# Patient Record
Sex: FEMALE | Race: BLACK OR AFRICAN AMERICAN | NOT HISPANIC OR LATINO | Employment: FULL TIME | URBAN - METROPOLITAN AREA
[De-identification: names, ages, dates, MRNs, and addresses within clinical notes are randomized per-mention and may not be internally consistent; named-entity substitution may affect disease eponyms.]

---

## 2019-03-29 ENCOUNTER — HOSPITAL ENCOUNTER (EMERGENCY)
Facility: OTHER | Age: 59
Discharge: HOME | End: 2019-03-29
Attending: EMERGENCY MEDICINE
Payer: COMMERCIAL

## 2019-03-29 VITALS
BODY MASS INDEX: 31.99 KG/M2 | OXYGEN SATURATION: 96 % | TEMPERATURE: 98 F | HEART RATE: 84 BPM | WEIGHT: 192 LBS | HEIGHT: 65 IN | RESPIRATION RATE: 18 BRPM | SYSTOLIC BLOOD PRESSURE: 133 MMHG | DIASTOLIC BLOOD PRESSURE: 65 MMHG

## 2019-03-29 DIAGNOSIS — R03.0 ELEVATED BLOOD PRESSURE READING WITHOUT DIAGNOSIS OF HYPERTENSION: Primary | ICD-10-CM

## 2019-03-29 DIAGNOSIS — R51.9 HEADACHE, UNSPECIFIED HEADACHE TYPE: ICD-10-CM

## 2019-03-29 LAB
ALBUMIN SERPL BCP-MCNC: 3.7 G/DL (ref 3.5–5.2)
ALP SERPL-CCNC: 81 U/L (ref 55–135)
ALT SERPL W/O P-5'-P-CCNC: 15 U/L (ref 10–44)
ANION GAP SERPL CALC-SCNC: 6 MMOL/L (ref 8–16)
AST SERPL-CCNC: 17 U/L (ref 10–40)
BACTERIA #/AREA URNS HPF: ABNORMAL /HPF
BASOPHILS # BLD AUTO: 0.03 K/UL (ref 0–0.2)
BASOPHILS NFR BLD: 0.5 % (ref 0–1.9)
BILIRUB SERPL-MCNC: 0.2 MG/DL (ref 0.1–1)
BILIRUB UR QL STRIP: NEGATIVE
BNP SERPL-MCNC: 13 PG/ML (ref 0–99)
BUN SERPL-MCNC: 17 MG/DL (ref 6–20)
CALCIUM SERPL-MCNC: 9.8 MG/DL (ref 8.7–10.5)
CHLORIDE SERPL-SCNC: 109 MMOL/L (ref 95–110)
CLARITY UR: CLEAR
CO2 SERPL-SCNC: 26 MMOL/L (ref 23–29)
COLOR UR: YELLOW
CREAT SERPL-MCNC: 1 MG/DL (ref 0.5–1.4)
DIFFERENTIAL METHOD: ABNORMAL
EOSINOPHIL # BLD AUTO: 0.1 K/UL (ref 0–0.5)
EOSINOPHIL NFR BLD: 1.7 % (ref 0–8)
ERYTHROCYTE [DISTWIDTH] IN BLOOD BY AUTOMATED COUNT: 13.2 % (ref 11.5–14.5)
EST. GFR  (AFRICAN AMERICAN): >60 ML/MIN/1.73 M^2
EST. GFR  (NON AFRICAN AMERICAN): >60 ML/MIN/1.73 M^2
GLUCOSE SERPL-MCNC: 104 MG/DL (ref 70–110)
GLUCOSE UR QL STRIP: NEGATIVE
HCT VFR BLD AUTO: 37.9 % (ref 37–48.5)
HGB BLD-MCNC: 12 G/DL (ref 12–16)
HGB UR QL STRIP: ABNORMAL
KETONES UR QL STRIP: NEGATIVE
LEUKOCYTE ESTERASE UR QL STRIP: NEGATIVE
LYMPHOCYTES # BLD AUTO: 2.9 K/UL (ref 1–4.8)
LYMPHOCYTES NFR BLD: 43.6 % (ref 18–48)
MCH RBC QN AUTO: 28.9 PG (ref 27–31)
MCHC RBC AUTO-ENTMCNC: 31.7 G/DL (ref 32–36)
MCV RBC AUTO: 91 FL (ref 82–98)
MICROSCOPIC COMMENT: ABNORMAL
MONOCYTES # BLD AUTO: 0.4 K/UL (ref 0.3–1)
MONOCYTES NFR BLD: 5.4 % (ref 4–15)
NEUTROPHILS # BLD AUTO: 3.2 K/UL (ref 1.8–7.7)
NEUTROPHILS NFR BLD: 48.6 % (ref 38–73)
NITRITE UR QL STRIP: NEGATIVE
PH UR STRIP: 6 [PH] (ref 5–8)
PLATELET # BLD AUTO: 303 K/UL (ref 150–350)
PMV BLD AUTO: 10.1 FL (ref 9.2–12.9)
POTASSIUM SERPL-SCNC: 4.5 MMOL/L (ref 3.5–5.1)
PROT SERPL-MCNC: 7.6 G/DL (ref 6–8.4)
PROT UR QL STRIP: NEGATIVE
RBC # BLD AUTO: 4.15 M/UL (ref 4–5.4)
RBC #/AREA URNS HPF: 1 /HPF (ref 0–4)
SODIUM SERPL-SCNC: 141 MMOL/L (ref 136–145)
SP GR UR STRIP: <=1.005 (ref 1–1.03)
SQUAMOUS #/AREA URNS HPF: 5 /HPF
TROPONIN I SERPL DL<=0.01 NG/ML-MCNC: <0.006 NG/ML (ref 0–0.03)
URN SPEC COLLECT METH UR: ABNORMAL
UROBILINOGEN UR STRIP-ACNC: NEGATIVE EU/DL
WBC # BLD AUTO: 6.65 K/UL (ref 3.9–12.7)
WBC #/AREA URNS HPF: 2 /HPF (ref 0–5)
YEAST URNS QL MICRO: ABNORMAL

## 2019-03-29 PROCEDURE — 96374 THER/PROPH/DIAG INJ IV PUSH: CPT

## 2019-03-29 PROCEDURE — 99284 EMERGENCY DEPT VISIT MOD MDM: CPT | Mod: 25

## 2019-03-29 PROCEDURE — 83880 ASSAY OF NATRIURETIC PEPTIDE: CPT

## 2019-03-29 PROCEDURE — 84484 ASSAY OF TROPONIN QUANT: CPT

## 2019-03-29 PROCEDURE — 80053 COMPREHEN METABOLIC PANEL: CPT

## 2019-03-29 PROCEDURE — 96375 TX/PRO/DX INJ NEW DRUG ADDON: CPT

## 2019-03-29 PROCEDURE — 63600175 PHARM REV CODE 636 W HCPCS: Performed by: EMERGENCY MEDICINE

## 2019-03-29 PROCEDURE — 81000 URINALYSIS NONAUTO W/SCOPE: CPT

## 2019-03-29 PROCEDURE — 85025 COMPLETE CBC W/AUTO DIFF WBC: CPT

## 2019-03-29 RX ORDER — DIPHENHYDRAMINE HYDROCHLORIDE 50 MG/ML
25 INJECTION INTRAMUSCULAR; INTRAVENOUS
Status: COMPLETED | OUTPATIENT
Start: 2019-03-29 | End: 2019-03-29

## 2019-03-29 RX ORDER — FLUTICASONE PROPIONATE 50 MCG
1 SPRAY, SUSPENSION (ML) NASAL 2 TIMES DAILY PRN
Qty: 15 G | Refills: 0 | Status: SHIPPED | OUTPATIENT
Start: 2019-03-29 | End: 2023-03-23 | Stop reason: SDUPTHER

## 2019-03-29 RX ORDER — CETIRIZINE HYDROCHLORIDE 10 MG/1
10 TABLET ORAL DAILY
Qty: 10 TABLET | Refills: 0 | Status: SHIPPED | OUTPATIENT
Start: 2019-03-29 | End: 2020-03-28

## 2019-03-29 RX ORDER — PROCHLORPERAZINE EDISYLATE 5 MG/ML
10 INJECTION INTRAMUSCULAR; INTRAVENOUS
Status: COMPLETED | OUTPATIENT
Start: 2019-03-29 | End: 2019-03-29

## 2019-03-29 RX ORDER — KETOROLAC TROMETHAMINE 30 MG/ML
10 INJECTION, SOLUTION INTRAMUSCULAR; INTRAVENOUS
Status: COMPLETED | OUTPATIENT
Start: 2019-03-29 | End: 2019-03-29

## 2019-03-29 RX ADMIN — PROCHLORPERAZINE EDISYLATE 10 MG: 5 INJECTION INTRAMUSCULAR; INTRAVENOUS at 09:03

## 2019-03-29 RX ADMIN — KETOROLAC TROMETHAMINE 10 MG: 30 INJECTION, SOLUTION INTRAMUSCULAR; INTRAVENOUS at 09:03

## 2019-03-29 RX ADMIN — DIPHENHYDRAMINE HYDROCHLORIDE 25 MG: 50 INJECTION, SOLUTION INTRAMUSCULAR; INTRAVENOUS at 09:03

## 2019-03-30 NOTE — ED PROVIDER NOTES
"Encounter Date: 3/29/2019    SCRIBE #1 NOTE: I, Alicia Brooksmayehu, am scribing for, and in the presence of, Dr. Augustine.       History     Chief Complaint   Patient presents with    Headache     +intermittent headache x 2 months. pt states " I usually get headaches off and on but over the last 2 months it has been getting worse. since last night it has been constant. 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 " pt answering questions appropriately, no slurred speech, facial droop. no bilateral limb drift noted. pt reports pain unreleived by exedrin. pt reports nausea but denies vomiting, cp, sob, fever, chills      Time seen by provider: 8:39 PM    This is a 58 y.o. female who presents with complaint of HA for years, daily in the past few weeks. Pt states that HA has a cap-like distribution to the entire head with intermittent "sharp" pains to the R temple. She states that severity of HAs change daily, but is 10/10 at it's worst. She has been taking Fioricet and other OTC HA medication without relief; pt last took Excedrin tension HA today. Pt states that SHERMAN feels like a "tension," but normally comes without neck pain. She does have intermittent neck pain but attributes this to lifting weights. She reports intermittent jaw pain, but has no known grinding of teeth or clenching of jaw while sleeping. She has had blurred vision for the past few days. Pt does have glasses, but does not wear them often or have them with her. She states that one eye is near-sighted, while the other is far-sighted. Pt also c/o tingling and numbness of the L hand, fingers included, for the past few weeks. 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 She reports episode of chest pain earlier this week, since resolved. She c/o fatigue and nausea. Pt has had BLE swelling; she was on lasix but states she has discontinued use. She reports sore throat, for which she saw ENT last month, starting on steroid and Z-Leroy at that " time which provided some relief. She has had a cough at night.  She has no hx of HTN, stating that she had normal blood pressure with OB/GYN in [facility] three months ago; she does not otherwise check blood pressure.Pt has not used any nasal sprays. She take Wellbutrin and Melatonin daily, but is on no other daily medications. She did go to urgent care today, but was advised to come to ED for further work-up. Pt does not smoke, drink, or use illicit drugs. She has NKDA. She denies any fever, chills, nasal congestion, vomiting, diarrhea, palpitations, SOB, wheezing, neck stiffness, back pain, dizziness, weakness, lightheadedness, photophobia, rash, and urinary sx. Pt does has chronic abdominal pain, for which she is receiving work-up with OB/GYN. Pt does not believe today's sx are related to abdominal pain.    The history is provided by the patient.     Review of patient's allergies indicates:  No Known Allergies  History reviewed. No pertinent past medical history.  History reviewed. No pertinent surgical history.  History reviewed. No pertinent family history.  Social History     Tobacco Use    Smoking status: Never Smoker   Substance Use Topics    Alcohol use: Never     Frequency: Never    Drug use: Never     Review of Systems   Constitutional: Positive for fatigue. Negative for chills and fever.   HENT: Positive for sore throat. Negative for congestion.         Positive for jaw pain.   Eyes: Positive for visual disturbance (blurred). Negative for photophobia.   Respiratory: Positive for cough. Negative for shortness of breath.    Cardiovascular: Positive for chest pain (resolved) and leg swelling. Negative for palpitations.   Gastrointestinal: Positive for abdominal pain (chronic, unchanged) and nausea. Negative for blood in stool, constipation, diarrhea and vomiting.   Genitourinary: Negative for decreased urine volume, dysuria and vaginal discharge.   Musculoskeletal: Positive for neck pain. Negative  for back pain, joint swelling and neck stiffness.   Skin: Negative for rash and wound.   Neurological: Positive for numbness (L hand) and headaches. Negative for dizziness, weakness and light-headedness.        Positive for tingling to the L hand.   Psychiatric/Behavioral: Negative for confusion.       Physical Exam     Initial Vitals [03/29/19 1951]   BP Pulse Resp Temp SpO2   (!) 194/95 93 18 98 °F (36.7 °C) 97 %      MAP       --           Physical Exam    Nursing note and vitals reviewed.  Constitutional: She appears well-developed and well-nourished. No distress.   HENT:   Head: Normocephalic and atraumatic.   Mouth/Throat: No oropharyngeal exudate.   Turbinate swelling. Mild cobblestoning of the posterior oropharynx.   Bilateral ears: Mild diminished light reflex. No erythema or effusion.  No tenderness to palpation over temporal artery bilaterally.   Eyes: Conjunctivae and EOM are normal. Pupils are equal, round, and reactive to light.   Evidence of cataracts bilaterally.   Neck: Normal range of motion. Neck supple.   Cardiovascular: Normal rate, regular rhythm and normal heart sounds.   No murmur heard.  Pulmonary/Chest: Breath sounds normal. No respiratory distress. She has no wheezes. She has no rhonchi. She has no rales.   Abdominal: Soft. Bowel sounds are normal. There is no tenderness.   Musculoskeletal: Normal range of motion.   Trace pretibial edema.   Neurological: She is alert and oriented to person, place, and time. She has normal strength. No cranial nerve deficit or sensory deficit.   AI/PI/R/U/M intact for motor and sensory exam bilateral upper extremities.   Skin: Skin is warm and dry. No rash noted.   Psychiatric: She has a normal mood and affect. Her behavior is normal.         ED Course   Procedures  Labs Reviewed   CBC W/ AUTO DIFFERENTIAL - Abnormal; Notable for the following components:       Result Value    MCHC 31.7 (*)     All other components within normal limits   COMPREHENSIVE  METABOLIC PANEL - Abnormal; Notable for the following components:    Anion Gap 6 (*)     All other components within normal limits   URINALYSIS, REFLEX TO URINE CULTURE - Abnormal; Notable for the following components:    Specific Gravity, UA <=1.005 (*)     Occult Blood UA 2+ (*)     All other components within normal limits    Narrative:     Preferred Collection Type->Urine, Clean Catch   URINALYSIS MICROSCOPIC - Abnormal; Notable for the following components:    Bacteria, UA Few (*)     Yeast, UA Few (*)     All other components within normal limits    Narrative:     Preferred Collection Type->Urine, Clean Catch   B-TYPE NATRIURETIC PEPTIDE   TROPONIN I             Medical Decision Making:   Clinical Tests:   Lab Tests: Ordered and Reviewed  ED Management:  Urgent evaluation a 58-year-old female who presents with many complaints.  Vital signs reveal hypertension, afebrile.  Physical exam reveals no demonstrable neurologic deficit.  I do suspect some radiculopathy since upper extremity paresthesias are not in a peripheral nerve distribution to suggest carpal tunnel syndrome.  I did consider possibility of CVA, but patient refused head CT.  She was treated with a headache cocktail with much improvement in the headache. I do not think this represents temporal arteritis or subarachnoid hemorrhage. Lab workup shows no evidence of end-organ damage from hypertension, and since she complained of chest pain earlier this week a screening troponin was performed and negative. I doubt AMI.  She complained of visual disturbance which I believe is related to her cataracts rather than a neurologic source.  Ultimately she felt much better and blood pressure improved by treating the headache pain, and she is discharged in good condition. I do suspect there is some underlying allergies, she was given prescriptions for Flonase and Zyrtec with the thought that there may be a sinus component to her headaches.  She is encouraged to  follow closely with her PCP or to return for new or worsening symptoms.            Scribe Attestation:   Scribe #1: I performed the above scribed service and the documentation accurately describes the services I performed. I attest to the accuracy of the note.    Attending Attestation:           Physician Attestation for Scribe:  Physician Attestation Statement for Scribe #1: I, Dr. Augustine, reviewed documentation, as scribed by Alicia De Jesus in my presence, and it is both accurate and complete.                    Clinical Impression:     1. Elevated blood pressure reading without diagnosis of hypertension    2. Headache, unspecified headache type                                 Lovely Augustine MD  04/02/19 [id number]

## 2019-03-30 NOTE — ED TRIAGE NOTES
Patient presents to ED with c/o generalized headache x 2 months. She reports no relief with OTC medications. She describes the headache as an intermittent throbbing headache, associated symptoms include L arm weakness, blurred vision, nausea, and sore throat.

## 2023-02-24 DIAGNOSIS — F32.5 MAJOR DEPRESSIVE DISORDER IN FULL REMISSION, UNSPECIFIED WHETHER RECURRENT: Primary | ICD-10-CM

## 2023-02-24 DIAGNOSIS — B37.31 CANDIDA VAGINITIS: ICD-10-CM

## 2023-02-25 DIAGNOSIS — Z00.00 PREVENTATIVE HEALTH CARE: Primary | ICD-10-CM

## 2023-02-25 RX ORDER — BUPROPION HYDROCHLORIDE 300 MG/1
300 TABLET ORAL DAILY
Qty: 30 TABLET | Refills: 11 | Status: SHIPPED | OUTPATIENT
Start: 2023-02-25 | End: 2023-02-27 | Stop reason: SDUPTHER

## 2023-02-25 RX ORDER — FLUCONAZOLE 150 MG/1
TABLET ORAL
Qty: 2 TABLET | Refills: 3 | Status: SHIPPED | OUTPATIENT
Start: 2023-02-25 | End: 2023-02-27 | Stop reason: SDUPTHER

## 2023-02-27 DIAGNOSIS — F32.5 MAJOR DEPRESSIVE DISORDER IN FULL REMISSION, UNSPECIFIED WHETHER RECURRENT: Primary | ICD-10-CM

## 2023-02-27 DIAGNOSIS — B37.31 CANDIDA VAGINITIS: ICD-10-CM

## 2023-02-27 RX ORDER — FLUCONAZOLE 150 MG/1
TABLET ORAL
Qty: 2 TABLET | Refills: 3 | Status: SHIPPED | OUTPATIENT
Start: 2023-02-27

## 2023-02-27 RX ORDER — BUPROPION HYDROCHLORIDE 300 MG/1
300 TABLET ORAL DAILY
Qty: 30 TABLET | Refills: 11 | Status: SHIPPED | OUTPATIENT
Start: 2023-02-27 | End: 2024-03-30 | Stop reason: SDUPTHER

## 2023-03-23 ENCOUNTER — TELEPHONE (OUTPATIENT)
Dept: PRIMARY CARE CLINIC | Facility: CLINIC | Age: 63
End: 2023-03-23

## 2023-03-23 ENCOUNTER — LAB VISIT (OUTPATIENT)
Dept: LAB | Facility: HOSPITAL | Age: 63
End: 2023-03-23
Attending: INTERNAL MEDICINE
Payer: COMMERCIAL

## 2023-03-23 ENCOUNTER — OFFICE VISIT (OUTPATIENT)
Dept: PRIMARY CARE CLINIC | Facility: CLINIC | Age: 63
End: 2023-03-23
Payer: COMMERCIAL

## 2023-03-23 VITALS
HEIGHT: 65 IN | BODY MASS INDEX: 31.04 KG/M2 | SYSTOLIC BLOOD PRESSURE: 124 MMHG | OXYGEN SATURATION: 97 % | WEIGHT: 186.31 LBS | HEART RATE: 93 BPM | DIASTOLIC BLOOD PRESSURE: 72 MMHG | RESPIRATION RATE: 18 BRPM

## 2023-03-23 DIAGNOSIS — E55.9 VITAMIN D DEFICIENCY: ICD-10-CM

## 2023-03-23 DIAGNOSIS — Z00.00 PREVENTATIVE HEALTH CARE: ICD-10-CM

## 2023-03-23 DIAGNOSIS — H40.9 GLAUCOMA, UNSPECIFIED GLAUCOMA TYPE, UNSPECIFIED LATERALITY: ICD-10-CM

## 2023-03-23 DIAGNOSIS — F32.A ANXIETY AND DEPRESSION: ICD-10-CM

## 2023-03-23 DIAGNOSIS — Z12.31 ENCOUNTER FOR SCREENING MAMMOGRAM FOR MALIGNANT NEOPLASM OF BREAST: Primary | ICD-10-CM

## 2023-03-23 DIAGNOSIS — E66.9 OBESITY (BMI 30.0-34.9): ICD-10-CM

## 2023-03-23 DIAGNOSIS — F98.8 ATTENTION DEFICIT DISORDER (ADD) WITHOUT HYPERACTIVITY: ICD-10-CM

## 2023-03-23 DIAGNOSIS — N76.1 CHRONIC VAGINITIS: ICD-10-CM

## 2023-03-23 DIAGNOSIS — F41.9 ANXIETY AND DEPRESSION: ICD-10-CM

## 2023-03-23 DIAGNOSIS — J30.1 SEASONAL ALLERGIC RHINITIS DUE TO POLLEN: ICD-10-CM

## 2023-03-23 DIAGNOSIS — G47.00 INSOMNIA, UNSPECIFIED TYPE: ICD-10-CM

## 2023-03-23 PROBLEM — E66.811 OBESITY (BMI 30.0-34.9): Status: ACTIVE | Noted: 2023-03-23

## 2023-03-23 LAB
25(OH)D3+25(OH)D2 SERPL-MCNC: 33 NG/ML (ref 30–96)
ALBUMIN SERPL BCP-MCNC: 3.9 G/DL (ref 3.5–5.2)
ALP SERPL-CCNC: 74 U/L (ref 55–135)
ALT SERPL W/O P-5'-P-CCNC: 21 U/L (ref 10–44)
ANION GAP SERPL CALC-SCNC: 7 MMOL/L (ref 8–16)
AST SERPL-CCNC: 17 U/L (ref 10–40)
BASOPHILS # BLD AUTO: 0.06 K/UL (ref 0–0.2)
BASOPHILS NFR BLD: 1.1 % (ref 0–1.9)
BILIRUB SERPL-MCNC: 0.4 MG/DL (ref 0.1–1)
BUN SERPL-MCNC: 8 MG/DL (ref 8–23)
CALCIUM SERPL-MCNC: 9.7 MG/DL (ref 8.7–10.5)
CHLORIDE SERPL-SCNC: 108 MMOL/L (ref 95–110)
CHOLEST SERPL-MCNC: 252 MG/DL (ref 120–199)
CHOLEST/HDLC SERPL: 3.2 {RATIO} (ref 2–5)
CO2 SERPL-SCNC: 26 MMOL/L (ref 23–29)
CREAT SERPL-MCNC: 1 MG/DL (ref 0.5–1.4)
DIFFERENTIAL METHOD: ABNORMAL
EOSINOPHIL # BLD AUTO: 0.1 K/UL (ref 0–0.5)
EOSINOPHIL NFR BLD: 1.7 % (ref 0–8)
ERYTHROCYTE [DISTWIDTH] IN BLOOD BY AUTOMATED COUNT: 12.8 % (ref 11.5–14.5)
EST. GFR  (NO RACE VARIABLE): >60 ML/MIN/1.73 M^2
ESTIMATED AVG GLUCOSE: 97 MG/DL (ref 68–131)
GLUCOSE SERPL-MCNC: 81 MG/DL (ref 70–110)
HBA1C MFR BLD: 5 % (ref 4–5.6)
HCT VFR BLD AUTO: 38.1 % (ref 37–48.5)
HCV AB SERPL QL IA: NORMAL
HDLC SERPL-MCNC: 78 MG/DL (ref 40–75)
HDLC SERPL: 31 % (ref 20–50)
HGB BLD-MCNC: 11.6 G/DL (ref 12–16)
HIV 1+2 AB+HIV1 P24 AG SERPL QL IA: NORMAL
IMM GRANULOCYTES # BLD AUTO: 0.01 K/UL (ref 0–0.04)
IMM GRANULOCYTES NFR BLD AUTO: 0.2 % (ref 0–0.5)
LDLC SERPL CALC-MCNC: 140.6 MG/DL (ref 63–159)
LYMPHOCYTES # BLD AUTO: 2.4 K/UL (ref 1–4.8)
LYMPHOCYTES NFR BLD: 45.8 % (ref 18–48)
MCH RBC QN AUTO: 28.6 PG (ref 27–31)
MCHC RBC AUTO-ENTMCNC: 30.4 G/DL (ref 32–36)
MCV RBC AUTO: 94 FL (ref 82–98)
MONOCYTES # BLD AUTO: 0.5 K/UL (ref 0.3–1)
MONOCYTES NFR BLD: 9.4 % (ref 4–15)
NEUTROPHILS # BLD AUTO: 2.2 K/UL (ref 1.8–7.7)
NEUTROPHILS NFR BLD: 41.8 % (ref 38–73)
NONHDLC SERPL-MCNC: 174 MG/DL
NRBC BLD-RTO: 0 /100 WBC
PLATELET # BLD AUTO: 294 K/UL (ref 150–450)
PMV BLD AUTO: 10.4 FL (ref 9.2–12.9)
POTASSIUM SERPL-SCNC: 4.1 MMOL/L (ref 3.5–5.1)
PROT SERPL-MCNC: 7.7 G/DL (ref 6–8.4)
RBC # BLD AUTO: 4.05 M/UL (ref 4–5.4)
SODIUM SERPL-SCNC: 141 MMOL/L (ref 136–145)
TRIGL SERPL-MCNC: 167 MG/DL (ref 30–150)
WBC # BLD AUTO: 5.22 K/UL (ref 3.9–12.7)

## 2023-03-23 PROCEDURE — 3008F PR BODY MASS INDEX (BMI) DOCUMENTED: ICD-10-PCS | Mod: CPTII,S$GLB,, | Performed by: INTERNAL MEDICINE

## 2023-03-23 PROCEDURE — 80061 LIPID PANEL: CPT | Performed by: INTERNAL MEDICINE

## 2023-03-23 PROCEDURE — 99999 PR PBB SHADOW E&M-EST. PATIENT-LVL V: CPT | Mod: PBBFAC,,, | Performed by: INTERNAL MEDICINE

## 2023-03-23 PROCEDURE — 80053 COMPREHEN METABOLIC PANEL: CPT | Performed by: INTERNAL MEDICINE

## 2023-03-23 PROCEDURE — 3078F PR MOST RECENT DIASTOLIC BLOOD PRESSURE < 80 MM HG: ICD-10-PCS | Mod: CPTII,S$GLB,, | Performed by: INTERNAL MEDICINE

## 2023-03-23 PROCEDURE — 1159F PR MEDICATION LIST DOCUMENTED IN MEDICAL RECORD: ICD-10-PCS | Mod: CPTII,S$GLB,, | Performed by: INTERNAL MEDICINE

## 2023-03-23 PROCEDURE — 3078F DIAST BP <80 MM HG: CPT | Mod: CPTII,S$GLB,, | Performed by: INTERNAL MEDICINE

## 2023-03-23 PROCEDURE — 99396 PREV VISIT EST AGE 40-64: CPT | Mod: S$GLB,,, | Performed by: INTERNAL MEDICINE

## 2023-03-23 PROCEDURE — 83036 HEMOGLOBIN GLYCOSYLATED A1C: CPT | Performed by: INTERNAL MEDICINE

## 2023-03-23 PROCEDURE — 86803 HEPATITIS C AB TEST: CPT | Performed by: INTERNAL MEDICINE

## 2023-03-23 PROCEDURE — 81514 NFCT DS BV&VAGINITIS DNA ALG: CPT | Performed by: INTERNAL MEDICINE

## 2023-03-23 PROCEDURE — 99999 PR PBB SHADOW E&M-EST. PATIENT-LVL V: ICD-10-PCS | Mod: PBBFAC,,, | Performed by: INTERNAL MEDICINE

## 2023-03-23 PROCEDURE — 36415 COLL VENOUS BLD VENIPUNCTURE: CPT | Mod: PN | Performed by: INTERNAL MEDICINE

## 2023-03-23 PROCEDURE — 99396 PR PREVENTIVE VISIT,EST,40-64: ICD-10-PCS | Mod: S$GLB,,, | Performed by: INTERNAL MEDICINE

## 2023-03-23 PROCEDURE — 1159F MED LIST DOCD IN RCRD: CPT | Mod: CPTII,S$GLB,, | Performed by: INTERNAL MEDICINE

## 2023-03-23 PROCEDURE — 3044F PR MOST RECENT HEMOGLOBIN A1C LEVEL <7.0%: ICD-10-PCS | Mod: CPTII,S$GLB,, | Performed by: INTERNAL MEDICINE

## 2023-03-23 PROCEDURE — 3044F HG A1C LEVEL LT 7.0%: CPT | Mod: CPTII,S$GLB,, | Performed by: INTERNAL MEDICINE

## 2023-03-23 PROCEDURE — 3074F SYST BP LT 130 MM HG: CPT | Mod: CPTII,S$GLB,, | Performed by: INTERNAL MEDICINE

## 2023-03-23 PROCEDURE — 87389 HIV-1 AG W/HIV-1&-2 AB AG IA: CPT | Performed by: INTERNAL MEDICINE

## 2023-03-23 PROCEDURE — 3008F BODY MASS INDEX DOCD: CPT | Mod: CPTII,S$GLB,, | Performed by: INTERNAL MEDICINE

## 2023-03-23 PROCEDURE — 82306 VITAMIN D 25 HYDROXY: CPT | Performed by: INTERNAL MEDICINE

## 2023-03-23 PROCEDURE — 3074F PR MOST RECENT SYSTOLIC BLOOD PRESSURE < 130 MM HG: ICD-10-PCS | Mod: CPTII,S$GLB,, | Performed by: INTERNAL MEDICINE

## 2023-03-23 PROCEDURE — 85025 COMPLETE CBC W/AUTO DIFF WBC: CPT | Performed by: INTERNAL MEDICINE

## 2023-03-23 RX ORDER — HYDROXYZINE PAMOATE 25 MG/1
25 CAPSULE ORAL
COMMUNITY
Start: 2022-06-06 | End: 2023-03-23 | Stop reason: SDUPTHER

## 2023-03-23 RX ORDER — FLUTICASONE PROPIONATE 50 MCG
1 SPRAY, SUSPENSION (ML) NASAL 2 TIMES DAILY PRN
Qty: 16 G | Refills: 5 | Status: SHIPPED | OUTPATIENT
Start: 2023-03-23 | End: 2023-03-23

## 2023-03-23 RX ORDER — HYDROXYZINE PAMOATE 25 MG/1
25 CAPSULE ORAL
Qty: 90 CAPSULE | Refills: 3 | Status: SHIPPED | OUTPATIENT
Start: 2023-03-23 | End: 2024-03-22

## 2023-03-23 RX ORDER — FLUTICASONE PROPIONATE 50 MCG
1 SPRAY, SUSPENSION (ML) NASAL
COMMUNITY
End: 2023-03-23

## 2023-03-23 RX ORDER — FLUTICASONE PROPIONATE 50 MCG
1 SPRAY, SUSPENSION (ML) NASAL 2 TIMES DAILY PRN
Qty: 16 G | Refills: 5 | Status: SHIPPED | OUTPATIENT
Start: 2023-03-23

## 2023-03-23 RX ORDER — LISDEXAMFETAMINE DIMESYLATE 40 MG/1
40 CAPSULE ORAL DAILY
Qty: 30 CAPSULE | Refills: 0 | Status: SHIPPED | OUTPATIENT
Start: 2023-03-23 | End: 2023-09-07 | Stop reason: SDUPTHER

## 2023-03-23 RX ORDER — FAMOTIDINE 20 MG/1
20 TABLET, FILM COATED ORAL
COMMUNITY
Start: 2023-03-18

## 2023-03-23 RX ORDER — ACETAMINOPHEN 500 MG
125 TABLET ORAL DAILY PRN
COMMUNITY
Start: 2022-06-06 | End: 2023-06-06

## 2023-03-23 RX ORDER — FLUTICASONE PROPIONATE 50 MCG
1 SPRAY, SUSPENSION (ML) NASAL 2 TIMES DAILY PRN
Qty: 16 G | Refills: 5 | Status: SHIPPED | OUTPATIENT
Start: 2023-03-23 | End: 2023-03-23 | Stop reason: SDUPTHER

## 2023-03-23 RX ORDER — OMEPRAZOLE 40 MG/1
40 CAPSULE, DELAYED RELEASE ORAL
COMMUNITY
Start: 2023-03-18 | End: 2023-03-23

## 2023-03-23 RX ORDER — LATANOPROST 50 UG/ML
SOLUTION/ DROPS OPHTHALMIC
COMMUNITY
Start: 2022-10-13

## 2023-03-23 RX ORDER — LISDEXAMFETAMINE DIMESYLATE 40 MG/1
40 CAPSULE ORAL
COMMUNITY
Start: 2022-04-13 | End: 2023-03-23 | Stop reason: SDUPTHER

## 2023-03-23 NOTE — PROGRESS NOTES
[facility] Progress Note      Chief Complaint     Establish Care       Subjective:      History of Present Illness:  Ana Lynch is a 62 y.o. female     ADD: would like refill of Vyvanse 40mg prn;  does take holidays; has been out x 2 months and has difficulty staying focused at work as the  of the [employer]    Glaucoma: needs to make appt with eye MD; taking Latanoprost    AR:  uses flonase and Zyrtec     Vit D : has not been taking it     Depression/Anxiety: stable on Wellbutrin XL 300mg daily and vistaril 1/2 tab po qhs     Obesity: needs to start exercising to cont to lose weight    GERD: no longer taking PPI and pepcid    Candida vaginitis: cont to have issues     Sedentary: not exercising as much because cares for mom until 11pm 3 times per wk; works late every day    Past Medical History:  No past medical history on file.    Past Surgical History:  No past surgical history on file.    Allergies:  Review of patient's allergies indicates:  No Known Allergies    Home Medications:  Current Outpatient Medications   Medication Sig Dispense Refill    buPROPion (WELLBUTRIN XL) 300 MG 24 hr tablet Take 1 tablet (300 mg total) by mouth once daily. 30 tablet 11    cholecalciferol, vitamin D3, 125 mcg (5,000 unit) Tab Take 125 mcg by mouth daily as needed.      famotidine (PEPCID) 20 MG tablet Take 20 mg by mouth.      fluconazole (DIFLUCAN) 150 MG Tab Take 1 tab po now and repeat in 4 days 2 tablet 3    latanoprost 0.005 % ophthalmic solution Place into both eyes.      cetirizine (ZYRTEC) 10 MG tablet Take 1 tablet (10 mg total) by mouth once daily. 10 tablet 0    fluticasone propionate (FLONASE) 50 mcg/actuation nasal spray 1 spray (50 mcg total) by Each Nostril route 2 (two) times daily as needed for Rhinitis. 16 g 5    hydrOXYzine pamoate (VISTARIL) 25 MG Cap Take 1 capsule (25 mg total) by mouth as needed. 90 capsule 3    lisdexamfetamine (VYVANSE) 40 MG Cap Take 1 capsule (40  "mg total) by mouth once daily. 30 capsule 0     No current facility-administered medications for this visit.        Family History:  No family history on file.    Social History:  Social History     Tobacco Use    Smoking status: Never   Substance Use Topics    Alcohol use: Never    Drug use: Never       Review of Systems:  Pertinent positives and negatives listed in HPI. All other systems are reviewed and are negative.    Health Maintaince :   Health Maintenance Topics with due status: Not Due       Topic Last Completion Date    Hemoglobin A1c (Diabetic Prevention Screening) 03/23/2023    Lipid Panel 03/23/2023           Eye: needs eye appt  Dental: UTD     Immunizations:   Tdap: n/a.  Influenza: needs today.  Pneumovax: n/a  Shingrex x 2: needs  COVID: needs booster   Hepatitis C:   Cancer Screening:  PAP: UTD in May 2023   Mammogram: UTD needs  Colonoscopy: 4/2023  DEXA:  n/a    The 10-year ASCVD risk score (Anne SINGH, et al., 2019) is: 6%    Values used to calculate the score:      Age: 62 years      Sex: Female      Is Non- : Yes      Diabetic: No      Tobacco smoker: No      Systolic Blood Pressure: 124 mmHg      Is BP treated: No      HDL Cholesterol: 78 mg/dL      Total Cholesterol: 252 mg/dL      Objective:   /72 (BP Location: Left arm, Patient Position: Sitting, BP Method: Large (Manual))   Pulse 93   Resp 18   Ht 5' 5" (1.651 m)   Wt 84.5 kg (186 lb 4.6 oz)   LMP  (LMP Unknown)   SpO2 97%   BMI 31.00 kg/m²      Body mass index is 31 kg/m².       Physical Examination:  General: Alert and awake in no apparent distress  HEENT: Normocephalic and atraumatic; Tms WNL  Eyes:  PERRL; EOMi with anicteric sclera and clear conjunctivae  Mouth:  Oropharynx clear and without exudate; moist mucous membranes  Neck:   Cervical nodes not enlarged; supple; no bruits  Cardio:  Regular rate and rhythm with normal S1 and S2; no murmurs or rubs  Resp:  CTAB; respirations unlabored; no " wheezes, crackles or rhonchi  Abdom: Soft, NTND with normoactive bowel sounds; negative HSM  Extrem: Warm and well-perfused with no clubbing, cyanosis or edema  Skin:  No rashes, lesions, or color changes  Pulses:  2+ and symmetric distally  Neuro:  AAOx3; cooperative and pleasant with no focal deficits    Laboratory:      Most Recent Data:  Lab Results   Component Value Date    WBC 5.22 03/23/2023    HGB 11.6 (L) 03/23/2023    HCT 38.1 03/23/2023     03/23/2023    CHOL 252 (H) 03/23/2023    TRIG 167 (H) 03/23/2023    HDL 78 (H) 03/23/2023    ALT 21 03/23/2023    AST 17 03/23/2023     03/23/2023    K 4.1 03/23/2023     03/23/2023    BUN 8 03/23/2023    CO2 26 03/23/2023    HGBA1C 5.0 03/23/2023              CBC:   WBC   Date Value Ref Range Status   03/23/2023 5.22 3.90 - 12.70 K/uL Final     Hemoglobin   Date Value Ref Range Status   03/23/2023 11.6 (L) 12.0 - 16.0 g/dL Final     Hematocrit   Date Value Ref Range Status   03/23/2023 38.1 37.0 - 48.5 % Final     Platelets   Date Value Ref Range Status   03/23/2023 294 150 - 450 K/uL Final     MCV   Date Value Ref Range Status   03/23/2023 94 82 - 98 fL Final     RDW   Date Value Ref Range Status   03/23/2023 12.8 11.5 - 14.5 % Final     BMP:   Sodium   Date Value Ref Range Status   03/23/2023 141 136 - 145 mmol/L Final     Potassium   Date Value Ref Range Status   03/23/2023 4.1 3.5 - 5.1 mmol/L Final     Chloride   Date Value Ref Range Status   03/23/2023 108 95 - 110 mmol/L Final     CO2   Date Value Ref Range Status   03/23/2023 26 23 - 29 mmol/L Final     BUN   Date Value Ref Range Status   03/23/2023 8 8 - 23 mg/dL Final     Creatinine   Date Value Ref Range Status   03/23/2023 1.0 0.5 - 1.4 mg/dL Final     Glucose   Date Value Ref Range Status   03/23/2023 81 70 - 110 mg/dL Final     Calcium   Date Value Ref Range Status   03/23/2023 9.7 8.7 - 10.5 mg/dL Final     LFTs:   Total Protein   Date Value Ref Range Status   03/23/2023 7.7 6.0 - 8.4  g/dL Final     Albumin   Date Value Ref Range Status   03/23/2023 3.9 3.5 - 5.2 g/dL Final     Total Bilirubin   Date Value Ref Range Status   03/23/2023 0.4 0.1 - 1.0 mg/dL Final     Comment:     For infants and newborns, interpretation of results should be based  on gestational age, weight and in agreement with clinical  observations.    Premature Infant recommended reference ranges:  Up to 24 hours.............<8.0 mg/dL  Up to 48 hours............<12.0 mg/dL  3-5 days..................<15.0 mg/dL  6-29 days.................<15.0 mg/dL       AST   Date Value Ref Range Status   03/23/2023 17 10 - 40 U/L Final     Alkaline Phosphatase   Date Value Ref Range Status   03/23/2023 74 55 - 135 U/L Final     ALT   Date Value Ref Range Status   03/23/2023 21 10 - 44 U/L Final     Coags: No results found for: INR, PROTIME, PTT  FLP:      Lab Results   Component Value Date    CHOL 252 (H) 03/23/2023     Lab Results   Component Value Date    HDL 78 (H) 03/23/2023     Lab Results   Component Value Date    LDLCALC 140.6 03/23/2023     Lab Results   Component Value Date    TRIG 167 (H) 03/23/2023     Lab Results   Component Value Date    CHOLHDL 31.0 03/23/2023      DM:      Hemoglobin A1C   Date Value Ref Range Status   03/23/2023 5.0 4.0 - 5.6 % Final     Comment:     ADA Screening Guidelines:  5.7-6.4%  Consistent with prediabetes  >or=6.5%  Consistent with diabetes    High levels of fetal hemoglobin interfere with the HbA1C  assay. Heterozygous hemoglobin variants (HbS, HgC, etc)do  not significantly interfere with this assay.   However, presence of multiple variants may affect accuracy.       LDL Cholesterol   Date Value Ref Range Status   03/23/2023 140.6 63.0 - 159.0 mg/dL Final     Comment:     The National Cholesterol Education Program (NCEP) has set the  following guidelines (reference values) for LDL Cholesterol:  Optimal.......................<130 mg/dL  Borderline High...............130-159  mg/dL  High..........................160-189 mg/dL  Very High.....................>190 mg/dL       Creatinine   Date Value Ref Range Status   03/23/2023 1.0 0.5 - 1.4 mg/dL Final     Thyroid: No results found for: TSH, FREET4, L5QBIXG, L4QLHTE, THYROIDAB  Anemia: No results found for: IRON, TIBC, FERRITIN, TQOHVMVJ72, FOLATE  Cardiac:   Troponin I   Date Value Ref Range Status   03/29/2019 <0.006 0.000 - 0.026 ng/mL Final     Comment:     The reference interval for Troponin I represents the 99th percentile   cutoff   for our facility and is consistent with 3rd generation assay   performance.       BNP   Date Value Ref Range Status   03/29/2019 13 0 - 99 pg/mL Final     Comment:     Values of less than 100 pg/ml are consistent with non-CHF populations.     Urinalysis:   Color, UA   Date Value Ref Range Status   03/23/2023 Yellow Yellow, Straw, Suzanna Final     Specific Gravity, UA   Date Value Ref Range Status   03/23/2023 1.015 1.005 - 1.030 Final     Nitrite, UA   Date Value Ref Range Status   03/23/2023 Negative Negative Final     Ketones, UA   Date Value Ref Range Status   03/23/2023 Negative Negative Final     Urobilinogen, UA   Date Value Ref Range Status   03/29/2019 Negative <2.0 EU/dL Final     WBC, UA   Date Value Ref Range Status   03/23/2023 38 (H) 0 - 5 /hpf Final       Other Results:  EKG (my interpretation):     Radiology:  No image results found.          Assessment/Plan     Ana Lynch is a 62 y.o. female with:  1. Encounter for screening mammogram for malignant neoplasm of breast  -     Cancel: Mammo Digital Screening with Contrast, Bilateral; Future; Expected date: 03/23/2023  -     Cancel: Mammo Digital Screening with Contrast, Bilateral; Future; Expected date: 03/23/2023  -     Mammo Digital Screening Bilat; Future; Expected date: 03/23/2023    2. Vitamin D deficiency  Assessment & Plan:  Cont Vit D 5000 units daily and check level today    Orders:  -     Vitamin D; Future; Expected date:  03/23/2023    3. Seasonal allergic rhinitis due to pollen  Assessment & Plan:  Cont flonase and Zyrtec    Orders:  -     Discontinue: fluticasone propionate (FLONASE) 50 mcg/actuation nasal spray; 1 spray (50 mcg total) by Each Nostril route 2 (two) times daily as needed for Rhinitis.  Dispense: 16 g; Refill: 5  -     fluticasone propionate (FLONASE) 50 mcg/actuation nasal spray; 1 spray (50 mcg total) by Each Nostril route 2 (two) times daily as needed for Rhinitis.  Dispense: 16 g; Refill: 5    4. Glaucoma, unspecified glaucoma type, unspecified laterality  Assessment & Plan:  Need to make appt with glaucoma specialist  -cont eye drops      5. Attention deficit disorder (ADD) without hyperactivity  Assessment & Plan:  Refill Vyvanse 40mg daily prn -take holidays    Orders:  -     lisdexamfetamine (VYVANSE) 40 MG Cap; Take 1 capsule (40 mg total) by mouth once daily.  Dispense: 30 capsule; Refill: 0    6. Anxiety and depression  Assessment & Plan:  Stable on Wellbutrin XL 300mg daily  Take Vistaril 1/2 tab po qhs to help with insomnia  Get more natural sunlight by walking      7. Preventative health care  Assessment & Plan:  Schedule eye appt  Get Shingrex and COVID booster  [place] [facility] [place] [place] [address]  [place]abs today  Need to start exercising 30 min 5 times per week preferably in natural sunlight    Orders:  -     Lipid Panel; Future; Expected date: 03/23/2023  -     Hemoglobin A1C; Future; Expected date: 03/23/2023  -     Urinalysis; Future; Expected date: 03/23/2023  -     Comprehensive Metabolic Panel; Future; Expected date: 03/23/2023  -     CBC Auto Differential; Future; Expected date: 03/23/2023  -     Hepatitis C Antibody; Future; Expected date: 03/23/2023  -     HIV 1/2 Ag/Ab (4th Gen); Future; Expected date: 03/23/2023    8. Insomnia, unspecified type  -     hydrOXYzine pamoate (VISTARIL) 25 MG Cap; Take 1 capsule (25 mg total) by mouth as needed.  Dispense: 90 capsule; Refill: 3    9. Chronic  vaginitis  -     VAGINOSIS SCREEN BY DNA PROBE    10. Obesity (BMI 30.0-34.9)  Assessment & Plan:  -Start healthy diet high in fiber (25-35 mg daily), low fat dairy, lean protein, low in saturated/trans fat; high in calcium/magnesium/potassium; 1.5 gm sodium diet; low in processed sugars and foods, ie avoid WHITE sugars, bread, pasta, rice, ice cream  -Drink 6-8 glasses of water daily  -Moderate exercise 30 min 5 times per week or 75 min intense exercise  -Start 8-10 hour intermittent fasting diet   -Avoid eating 3 hours prior to bedtime                 I spent a total of 45 minutes on the day of the visit.This includes face to face time and non-face to face time preparing to see the patient (eg, review of tests), obtaining and/or reviewing separately obtained history, documenting clinical information in the electronic or other health record, independently interpreting results and communicating results to the patient/family/caregiver, or care coordinator.   Code Status:     Josephine Underwood MD

## 2023-03-23 NOTE — ASSESSMENT & PLAN NOTE
Stable on Wellbutrin XL 300mg daily  Take Vistaril 1/2 tab po qhs to help with insomnia  Get more natural sunlight by walking

## 2023-03-23 NOTE — PATIENT INSTRUCTIONS
Get COVID booster and Shingrex    -Start healthy diet high in fiber (25-35 mg daily), low fat dairy, lean protein, low in saturated/trans fat; high in calcium/magnesium/potassium; 1.5 gm sodium diet; low in processed sugars and foods, ie avoid WHITE sugars, bread, pasta, rice, ice cream  -Drink 6-8 glasses of water daily  -Moderate exercise 30 min 5 times per week or 75 min intense exercise  -Start 8-10 hour intermittent fasting diet   -Avoid eating 3 hours prior to bedtime

## 2023-03-24 DIAGNOSIS — R31.29 MICROSCOPIC HEMATURIA: Primary | ICD-10-CM

## 2023-03-24 NOTE — ASSESSMENT & PLAN NOTE
-Start healthy diet high in fiber (25-35 mg daily), low fat dairy, lean protein, low in saturated/trans fat; high in calcium/magnesium/potassium; 1.5 gm sodium diet; low in processed sugars and foods, ie avoid WHITE sugars, bread, pasta, rice, ice cream  -Drink 6-8 glasses of water daily  -Moderate exercise 30 min 5 times per week or 75 min intense exercise  -Start 8-10 hour intermittent fasting diet   -Avoid eating 3 hours prior to bedtime

## 2023-03-24 NOTE — ASSESSMENT & PLAN NOTE
Schedule eye appt  Get Shingrex and COVID booster  Schedule [facility]G at [facility] [place] on [address]  Labs today  Need to start exercising 30 min 5 times per week preferably in natural sunlight

## 2023-03-25 LAB
BACTERIAL VAGINOSIS DNA: NEGATIVE
CANDIDA GLABRATA DNA: NEGATIVE
CANDIDA KRUSEI DNA: NEGATIVE
CANDIDA RRNA VAG QL PROBE: NEGATIVE
T VAGINALIS RRNA GENITAL QL PROBE: POSITIVE

## 2023-03-26 ENCOUNTER — PATIENT MESSAGE (OUTPATIENT)
Dept: PRIMARY CARE CLINIC | Facility: CLINIC | Age: 63
End: 2023-03-26
Payer: COMMERCIAL

## 2023-03-26 DIAGNOSIS — A59.9 TRICHOMONAS VAGINALIS INFECTION: Primary | ICD-10-CM

## 2023-03-26 RX ORDER — METRONIDAZOLE 500 MG/1
500 TABLET ORAL EVERY 12 HOURS
Qty: 14 TABLET | Refills: 3 | Status: SHIPPED | OUTPATIENT
Start: 2023-03-26

## 2023-06-26 PROBLEM — Z00.00 PREVENTATIVE HEALTH CARE: Status: RESOLVED | Noted: 2023-03-23 | Resolved: 2023-06-26

## 2023-09-07 DIAGNOSIS — F98.8 ATTENTION DEFICIT DISORDER (ADD) WITHOUT HYPERACTIVITY: ICD-10-CM

## 2023-09-07 RX ORDER — LISDEXAMFETAMINE DIMESYLATE 40 MG/1
40 CAPSULE ORAL DAILY
Qty: 30 CAPSULE | Refills: 0 | Status: SHIPPED | OUTPATIENT
Start: 2023-09-07 | End: 2023-12-01 | Stop reason: SDUPTHER

## 2023-11-02 ENCOUNTER — TELEPHONE (OUTPATIENT)
Dept: PRIMARY CARE CLINIC | Facility: CLINIC | Age: 63
End: 2023-11-02
Payer: COMMERCIAL

## 2023-11-02 NOTE — TELEPHONE ENCOUNTER
----- Message from Tiffanie Sarmiento sent at 10/30/2023  4:12 PM CDT -----  Regarding: self [phone]  Type: RX Refill Request       Who Called: Patient       Have you contacted your pharmacy: yes        Refill or New Rx: refill       RX Name and Strength: buPROPion (WELLBUTRIN XL) 300 MG 24 hr tablet    Preferred Pharmacy with phone number:   [id number] [place] [id number] AT [facility] [address]  [facility] [address]  [facility] [address]  Phone: [phone] Fax: [phone]      Local or Mail Order: local       Ordering Provider: Lo       Would the patient rather a call back or a response via [facility]? Call back       Best Call Back Number: [phone]       Additional Information:

## 2023-12-01 DIAGNOSIS — F98.8 ATTENTION DEFICIT DISORDER (ADD) WITHOUT HYPERACTIVITY: ICD-10-CM

## 2023-12-01 RX ORDER — LISDEXAMFETAMINE DIMESYLATE 40 MG/1
40 CAPSULE ORAL DAILY
Qty: 30 CAPSULE | Refills: 0 | Status: SHIPPED | OUTPATIENT
Start: 2023-12-01

## 2024-03-28 DIAGNOSIS — F32.5 MAJOR DEPRESSIVE DISORDER IN FULL REMISSION, UNSPECIFIED WHETHER RECURRENT: ICD-10-CM

## 2024-03-29 NOTE — TELEPHONE ENCOUNTER
Refill Routing Note   Medication(s) are not appropriate for processing by [facility] for the following reason(s):        Responsible provider unclear    [facility] action(s):  Defer        Medication Therapy Plan: Unable to assess. Patient does not have a PCP or participating provider listed in EPIC. Will defer to last known prescriber for review.      Appointments  past 12m or future 3m with PCP    Date Provider   Last Visit   3/23/2023 Josephine Underwood MD   Next Visit   Visit date not found Josephine Underwood MD   ED visits in past 90 days: 0        Note composed:4:57 PM 03/29/2024

## 2024-03-30 RX ORDER — BUPROPION HYDROCHLORIDE 300 MG/1
300 TABLET ORAL
Qty: 30 TABLET | Refills: 11 | Status: SHIPPED | OUTPATIENT
Start: 2024-03-30

## 2024-04-08 DIAGNOSIS — J30.1 SEASONAL ALLERGIC RHINITIS DUE TO POLLEN: ICD-10-CM

## 2024-04-08 DIAGNOSIS — F98.8 ATTENTION DEFICIT DISORDER (ADD) WITHOUT HYPERACTIVITY: ICD-10-CM

## 2024-04-09 NOTE — TELEPHONE ENCOUNTER
Appointment Request   Ana Cris   Sent:   6:57 PM   To: [facility] Clinical Support Staff      Ana Lynch   MRN: [id number] : 1960   Pt Home: [phone]     Entered: [phone]        Message    Appointment Request From: Ana Lynch      With Provider: Josephine Underwood MD [[facility]]      Preferred Date Range: Any      Preferred Times: Any Time      Reason for visit: Annual Check-up      Comments:   I am due for an annual appointment, need mammogram orders, etc. Dr. Underwood is booked so I am inquiring about a virtual visit. Thanks [phone]

## 2024-04-15 ENCOUNTER — TELEPHONE (OUTPATIENT)
Dept: PRIMARY CARE CLINIC | Facility: CLINIC | Age: 64
End: 2024-04-15
Payer: COMMERCIAL

## 2024-04-15 NOTE — TELEPHONE ENCOUNTER
----- Message from Josephine Underwood MD sent at 4/15/2024  1:28 PM CDT -----  Regarding: Refill  Does she have an appt! I havent seen her in a year? Before refill requests are sent to me, please make sure the pts have appropriate appts established joan for scheduled meds. Thanks!

## 2024-04-15 NOTE — TELEPHONE ENCOUNTER
Care Due:                  Date            Visit Type   Department     Provider  --------------------------------------------------------------------------------                                NP -                              PRIMARY      [facility]  Last Visit: 03-      CARE ([facility])   Care           Josephine Underwood  Next Visit: None Scheduled  None         None Found                                                            Last  Test          Frequency    Reason                     Performed    Due Date  --------------------------------------------------------------------------------    Office Visit  15 months..  buPROPion................  03-   06-    Health [facility] Embedded Care Due Messages. Reference number: [id number].   4/15/2024 10:50:16 AM CDT

## 2024-05-01 RX ORDER — LISDEXAMFETAMINE DIMESYLATE 40 MG/1
40 CAPSULE ORAL DAILY
Qty: 30 CAPSULE | Refills: 0 | OUTPATIENT
Start: 2024-05-01

## 2024-05-01 RX ORDER — FLUTICASONE PROPIONATE 50 MCG
1 SPRAY, SUSPENSION (ML) NASAL 2 TIMES DAILY PRN
Qty: 16 G | Refills: 5 | OUTPATIENT
Start: 2024-05-01

## 2024-05-07 ENCOUNTER — OFFICE VISIT (OUTPATIENT)
Dept: PRIMARY CARE CLINIC | Facility: CLINIC | Age: 64
End: 2024-05-07
Payer: COMMERCIAL

## 2024-05-07 VITALS
DIASTOLIC BLOOD PRESSURE: 76 MMHG | HEART RATE: 79 BPM | HEIGHT: 65 IN | BODY MASS INDEX: 30.3 KG/M2 | OXYGEN SATURATION: 98 % | SYSTOLIC BLOOD PRESSURE: 128 MMHG | WEIGHT: 181.88 LBS

## 2024-05-07 DIAGNOSIS — K21.9 GASTROESOPHAGEAL REFLUX DISEASE WITHOUT ESOPHAGITIS: ICD-10-CM

## 2024-05-07 DIAGNOSIS — Z78.0 POSTMENOPAUSAL: ICD-10-CM

## 2024-05-07 DIAGNOSIS — F43.23 ADJUSTMENT REACTION WITH ANXIETY AND DEPRESSION: ICD-10-CM

## 2024-05-07 DIAGNOSIS — J30.1 SEASONAL ALLERGIC RHINITIS DUE TO POLLEN: ICD-10-CM

## 2024-05-07 DIAGNOSIS — Z00.00 PREVENTATIVE HEALTH CARE: Primary | ICD-10-CM

## 2024-05-07 DIAGNOSIS — Z12.31 ENCOUNTER FOR SCREENING MAMMOGRAM FOR MALIGNANT NEOPLASM OF BREAST: ICD-10-CM

## 2024-05-07 DIAGNOSIS — E66.9 OBESITY (BMI 30.0-34.9): ICD-10-CM

## 2024-05-07 DIAGNOSIS — F98.8 ATTENTION DEFICIT DISORDER (ADD) WITHOUT HYPERACTIVITY: ICD-10-CM

## 2024-05-07 DIAGNOSIS — E55.9 VITAMIN D DEFICIENCY: ICD-10-CM

## 2024-05-07 DIAGNOSIS — H40.9 GLAUCOMA, UNSPECIFIED GLAUCOMA TYPE, UNSPECIFIED LATERALITY: ICD-10-CM

## 2024-05-07 PROCEDURE — 1159F MED LIST DOCD IN RCRD: CPT | Mod: CPTII,S$GLB,, | Performed by: INTERNAL MEDICINE

## 2024-05-07 PROCEDURE — 3074F SYST BP LT 130 MM HG: CPT | Mod: CPTII,S$GLB,, | Performed by: INTERNAL MEDICINE

## 2024-05-07 PROCEDURE — 3044F HG A1C LEVEL LT 7.0%: CPT | Mod: CPTII,S$GLB,, | Performed by: INTERNAL MEDICINE

## 2024-05-07 PROCEDURE — 3078F DIAST BP <80 MM HG: CPT | Mod: CPTII,S$GLB,, | Performed by: INTERNAL MEDICINE

## 2024-05-07 PROCEDURE — 99999 PR PBB SHADOW E&M-EST. PATIENT-LVL V: CPT | Mod: PBBFAC,,, | Performed by: INTERNAL MEDICINE

## 2024-05-07 PROCEDURE — 3008F BODY MASS INDEX DOCD: CPT | Mod: CPTII,S$GLB,, | Performed by: INTERNAL MEDICINE

## 2024-05-07 PROCEDURE — 99396 PREV VISIT EST AGE 40-64: CPT | Mod: S$GLB,,, | Performed by: INTERNAL MEDICINE

## 2024-05-07 RX ORDER — FLUTICASONE PROPIONATE 50 MCG
1 SPRAY, SUSPENSION (ML) NASAL 2 TIMES DAILY PRN
Qty: 16 G | Refills: 5 | Status: SHIPPED | OUTPATIENT
Start: 2024-05-07

## 2024-05-07 RX ORDER — BUSPIRONE HYDROCHLORIDE 10 MG/1
10 TABLET ORAL 3 TIMES DAILY
Qty: 90 TABLET | Refills: 11 | Status: SHIPPED | OUTPATIENT
Start: 2024-05-07 | End: 2025-05-07

## 2024-05-07 RX ORDER — MONTELUKAST SODIUM 10 MG/1
10 TABLET ORAL
COMMUNITY
Start: 2023-12-10 | End: 2024-05-07 | Stop reason: SDUPTHER

## 2024-05-07 RX ORDER — LISDEXAMFETAMINE DIMESYLATE 40 MG/1
40 CAPSULE ORAL DAILY
Qty: 30 CAPSULE | Refills: 0 | Status: SHIPPED | OUTPATIENT
Start: 2024-05-07

## 2024-05-07 RX ORDER — TIMOLOL MALEATE 5 MG/ML
SOLUTION/ DROPS OPHTHALMIC
COMMUNITY
Start: 2024-04-26

## 2024-05-07 RX ORDER — MONTELUKAST SODIUM 10 MG/1
10 TABLET ORAL NIGHTLY
Qty: 90 TABLET | Refills: 3 | Status: SHIPPED | OUTPATIENT
Start: 2024-05-07

## 2024-05-07 RX ORDER — FAMOTIDINE 40 MG/1
40 TABLET, FILM COATED ORAL 2 TIMES DAILY
Qty: 60 TABLET | Refills: 5 | Status: SHIPPED | OUTPATIENT
Start: 2024-05-07

## 2024-05-07 NOTE — ASSESSMENT & PLAN NOTE
Exercise 30 min 5 times per week, decrease portion sizes by 50%; encourage plant-based diet;  drink 6-8 glasses of water daily, avoid fast foods, creamy, rich foods joan ice cream, cheese creamy salad dressings;avoid eating 3 hours prior to bedtime; consider 8-10 hour intermittent diet; avoid simple sugars joan white bread, rice, potatoes, noodles/pasta; No sweetened drinks.

## 2024-05-07 NOTE — PATIENT INSTRUCTIONS
Restart Vyvanse 40mg daily   Monitor for side effects ie abdominal pain, N/V, headaches, insomnia, weight loss, appetite suppression   Take frequent holidays      Get labs   Schedule MMG, DEXA and GYN at [facility]    Refer back to eye MD     Trial of Buspar 10mg 3 times per day     Virtual visit in 4 wks     Exercise 30 min 5 times per week, decrease portion sizes by 50%; encourage plant-based diet;  drink 6-8 glasses of water daily, avoid fast foods, creamy, rich foods joan ice cream, cheese creamy salad dressings;avoid eating 3 hours prior to bedtime; consider 8-10 hour intermittent diet; avoid simple sugars joan white bread, rice, potatoes, noodles/pasta; No sweetened drinks.

## 2024-05-07 NOTE — ASSESSMENT & PLAN NOTE
Get labs   Schedule MMG , DEXA ,and GYN at [facility]    Refer back to eye MD     Virtual in 4 wks

## 2024-05-07 NOTE — ASSESSMENT & PLAN NOTE
Restart Vyvanse 40mg daily   Monitor for side effects ie abdominal pain, N/V, headaches, insomnia, weight loss, appetite suppression   Take frequent holidays

## 2024-05-07 NOTE — PROGRESS NOTES
[facility] Progress Note      Chief Complaint     Annual Exam      Subjective:      History of Present Illness:  Ana Lynch is a 63 y.o. female     ADD: would like refill of Vyvanse 40mg prn;  does take holidays; has been out x 2 months and has difficulty staying focused at work as the  of the [employer]     Glaucoma: needs to make appt with eye MD; taking Latanoprost     AR:  uses flonase and Zyrtec; needs refill of singulair      Vit D: has not been taking it     Postmenopausal x 6 years: needs DEXA      Depression/Anxiety: stable on Wellbutrin XL 300mg daily and vistaril 1/2 tab po qhs prn   -PHQ-2  Feels her anxiety is poorly controlled but would feel better if she restarted Vyvanse      Obesity: needs to start exercising to cont to lose weight     GERD: no longer taking PPI and pepcid      Sedentary: not exercising as much because 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    Past Surgical History:  No past surgical history on file.    Allergies:  Review of patient's allergies indicates:  No Known Allergies    Home Medications:  Current Outpatient Medications   Medication Sig Dispense Refill    buPROPion (WELLBUTRIN XL) 300 MG 24 hr tablet TAKE 1 TABLET(300 MG) BY MOUTH EVERY DAY 30 tablet 11    fluconazole (DIFLUCAN) 150 MG Tab Take 1 tab po now and repeat in 4 days 2 tablet 3    latanoprost 0.005 % ophthalmic solution Place into both eyes.      timolol maleate 0.5% (TIMOPTIC) 0.5 % Drop       busPIRone (BUSPAR) 10 MG tablet Take 1 tablet (10 mg total) by mouth 3 (three) times  daily. 90 tablet 11    cetirizine (ZYRTEC) 10 MG tablet Take 1 tablet (10 mg total) by mouth once daily. 10 tablet 0    famotidine (PEPCID) 40 MG tablet Take 1 tablet (40 mg total) by mouth 2 (two) times daily. 60 tablet 5    fluticasone propionate (FLONASE) 50 mcg/actuation nasal spray 1 spray (50 mcg total) by Each Nostril route 2 (two) times daily as needed for Rhinitis. 16 g 5    lisdexamfetamine (VYVANSE) 40 MG Cap Take 1 capsule (40 mg total) by mouth once daily. 30 capsule 0    montelukast (SINGULAIR) 10 mg tablet Take 1 tablet (10 mg total) by mouth every evening. 90 tablet 3     No current facility-administered medications for this visit.        Family History:  No family history on file.    Social History:  Social History     Tobacco Use    Smoking status: Never    Smokeless tobacco: Never   Substance Use Topics    Alcohol use: Never    Drug use: Never       Review of Systems:  Pertinent positives and negatives listed in HPI. All other systems are reviewed and are negative.    Health Maintaince :   Health Maintenance Topics with due status: Not Due       Topic Last Completion Date    Influenza Vaccine 11/29/2021    Colorectal Cancer Screening 04/28/2023    Hemoglobin A1c (Diabetic Prevention Screening) 05/08/2024    Lipid Panel 05/08/2024           Eye:   Dental:     Immunizations:   Tdap: n/a.  Influenza: needs.  Pneumovax: n/a  Shingrex x 2: needs  COVID: needs  Hepatitis C:   Cancer Screening:  PAP: UTD needs   Mammogram: needs  Colonoscopy: UTD 4/28/2033  DEXA:  needs   LDCT: n/a    The 10-year ASCVD risk score (Anne SINGH, et al., 2019) is: 6.4%    Values used to calculate the score:      Age: 63 years      Sex: Female      Is Non- : Yes      Diabetic: No      Tobacco smoker: No      Systolic Blood Pressure: 128 mmHg      Is BP treated: No      HDL Cholesterol: 80 mg/dL      Total Cholesterol: 219 mg/dL      Objective:   /76 (BP Location: Left arm, Patient Position:  "Sitting, BP Method: Medium (Manual))   Pulse 79   Ht 5' 5" (1.651 m)   Wt 82.5 kg (181 lb 14.1 oz)   LMP  (LMP Unknown)   SpO2 98%   BMI 30.27 kg/m²      Body mass index is 30.27 kg/m².       Physical Examination:  General: Alert and awake in no apparent distress  HEENT: Normocephalic and atraumatic; Tms WNL  Eyes:  PERRL; EOMi with anicteric sclera and clear conjunctivae  Mouth:  Oropharynx clear and without exudate; moist mucous membranes  Neck:   Cervical nodes not enlarged; supple; no bruits  Cardio:  Regular rate and rhythm with normal S1 and S2; no murmurs or rubs  Resp:  CTAB; respirations unlabored; no wheezes, crackles or rhonchi  Abdom: Soft, NTND with normoactive bowel sounds; negative HSM  Extrem: Warm and well-perfused with no clubbing, cyanosis or edema  Skin:  No rashes, lesions, or color changes  Pulses:  2+ and symmetric distally  Neuro:  AAOx3; cooperative and pleasant with no focal deficits    Laboratory:      Most Recent Data:  Lab Results   Component Value Date    WBC 4.60 05/08/2024    HGB 11.6 (L) 05/08/2024    HCT 38.0 05/08/2024     05/08/2024    CHOL 219 (H) 05/08/2024    TRIG 98 05/08/2024    HDL 80 (H) 05/08/2024    ALT 13 05/08/2024    AST 15 05/08/2024     05/08/2024    K 4.3 05/08/2024     (H) 05/08/2024    BUN 12 05/08/2024    CO2 24 05/08/2024    HGBA1C 5.1 05/08/2024              CBC:   WBC   Date Value Ref Range Status   05/08/2024 4.60 3.90 - 12.70 K/uL Final     Hemoglobin   Date Value Ref Range Status   05/08/2024 11.6 (L) 12.0 - 16.0 g/dL Final     Hematocrit   Date Value Ref Range Status   05/08/2024 38.0 37.0 - 48.5 % Final     Platelets   Date Value Ref Range Status   05/08/2024 271 150 - 450 K/uL Final     MCV   Date Value Ref Range Status   05/08/2024 95 82 - 98 fL Final     RDW   Date Value Ref Range Status   05/08/2024 12.6 11.5 - 14.5 % Final     BMP:   Sodium   Date Value Ref Range Status   05/08/2024 144 136 - 145 mmol/L Final     Potassium " "  Date Value Ref Range Status   05/08/2024 4.3 3.5 - 5.1 mmol/L Final     Chloride   Date Value Ref Range Status   05/08/2024 112 (H) 95 - 110 mmol/L Final     CO2   Date Value Ref Range Status   05/08/2024 24 23 - 29 mmol/L Final     BUN   Date Value Ref Range Status   05/08/2024 12 8 - 23 mg/dL Final     Creatinine   Date Value Ref Range Status   05/08/2024 1.1 0.5 - 1.4 mg/dL Final     Glucose   Date Value Ref Range Status   05/08/2024 105 70 - 110 mg/dL Final     Calcium   Date Value Ref Range Status   05/08/2024 9.1 8.7 - 10.5 mg/dL Final     LFTs:   Total Protein   Date Value Ref Range Status   05/08/2024 7.3 6.0 - 8.4 g/dL Final     Albumin   Date Value Ref Range Status   05/08/2024 3.7 3.5 - 5.2 g/dL Final     Total Bilirubin   Date Value Ref Range Status   05/08/2024 0.5 0.1 - 1.0 mg/dL Final     Comment:     For infants and newborns, interpretation of results should be based  on gestational age, weight and in agreement with clinical  observations.    Premature Infant recommended reference ranges:  Up to 24 hours.............<8.0 mg/dL  Up to 48 hours............<12.0 mg/dL  3-5 days..................<15.0 mg/dL  6-29 days.................<15.0 mg/dL       AST   Date Value Ref Range Status   05/08/2024 15 10 - 40 U/L Final     Alkaline Phosphatase   Date Value Ref Range Status   05/08/2024 68 55 - 135 U/L Final     ALT   Date Value Ref Range Status   05/08/2024 13 10 - 44 U/L Final     Coags: No results found for: "INR", "PROTIME", "PTT"  FLP:      Lab Results   Component Value Date    CHOL 219 (H) 05/08/2024    CHOL 252 (H) 03/23/2023     Lab Results   Component Value Date    HDL 80 (H) 05/08/2024    HDL 78 (H) 03/23/2023     Lab Results   Component Value Date    LDLCALC 119.4 05/08/2024    LDLCALC 140.6 03/23/2023     Lab Results   Component Value Date    TRIG 98 05/08/2024    TRIG 167 (H) 03/23/2023     Lab Results   Component Value Date    CHOLHDL 36.5 05/08/2024    CHOLHDL 31.0 03/23/2023      DM:    " "  Hemoglobin A1C   Date Value Ref Range Status   05/08/2024 5.1 4.0 - 5.6 % Final     Comment:     ADA Screening Guidelines:  5.7-6.4%  Consistent with prediabetes  >or=6.5%  Consistent with diabetes    High levels of fetal hemoglobin interfere with the HbA1C  assay. Heterozygous hemoglobin variants (HbS, HgC, etc)do  not significantly interfere with this assay.   However, presence of multiple variants may affect accuracy.     03/23/2023 5.0 4.0 - 5.6 % Final     Comment:     ADA Screening Guidelines:  5.7-6.4%  Consistent with prediabetes  >or=6.5%  Consistent with diabetes    High levels of fetal hemoglobin interfere with the HbA1C  assay. Heterozygous hemoglobin variants (HbS, HgC, etc)do  not significantly interfere with this assay.   However, presence of multiple variants may affect accuracy.       LDL Cholesterol   Date Value Ref Range Status   05/08/2024 119.4 63.0 - 159.0 mg/dL Final     Comment:     The National Cholesterol Education Program (NCEP) has set the  following guidelines (reference values) for LDL Cholesterol:  Optimal.......................<130 mg/dL  Borderline High...............130-159 mg/dL  High..........................160-189 mg/dL  Very High.....................>190 mg/dL       Creatinine   Date Value Ref Range Status   05/08/2024 1.1 0.5 - 1.4 mg/dL Final     Thyroid: No results found for: "TSH", "FREET4", "R6BIMVU", "Q1BSDGI", "THYROIDAB"  Anemia: No results found for: "IRON", "TIBC", "FERRITIN", "GEXWSLSV54", "FOLATE"  Cardiac:   Troponin I   Date Value Ref Range Status   03/29/2019 <0.006 0.000 - 0.026 ng/mL Final     Comment:     The reference interval for Troponin I represents the 99th percentile   cutoff   for our facility and is consistent with 3rd generation assay   performance.       BNP   Date Value Ref Range Status   03/29/2019 13 0 - 99 pg/mL Final     Comment:     Values of less than 100 pg/ml are consistent with non-CHF populations.     Urinalysis:   Color, UA   Date Value " Ref Range Status   05/08/2024 Yellow Yellow, Straw, Suzanna Final     Specific Gravity, UA   Date Value Ref Range Status   05/08/2024 1.020 1.005 - 1.030 Final     Nitrite, UA   Date Value Ref Range Status   05/08/2024 Negative Negative Final     Ketones, UA   Date Value Ref Range Status   05/08/2024 Negative Negative Final     Urobilinogen, UA   Date Value Ref Range Status   05/08/2024 Negative <2.0 EU/dL Final     WBC, UA   Date Value Ref Range Status   05/08/2024 1 0 - 5 /hpf Final       Other Results:  EKG (my interpretation):     Radiology:  No image results found.          Assessment/Plan     Ana Lynch is a 63 y.o. female with:  1. Preventative health care  Assessment & Plan:  Get labs   Schedule MMG , DEXA ,and GYN at [facility]    Refer back to eye MD Zelaya in 4 wks    Orders:  -     Lipid Panel; Future; Expected date: 05/07/2024  -     Hemoglobin A1C; Future; Expected date: 05/07/2024  -     Urinalysis; Future; Expected date: 05/07/2024  -     Comprehensive Metabolic Panel; Future; Expected date: 05/07/2024  -     CBC Auto Differential; Future; Expected date: 05/07/2024  -     Cancel: Hepatitis C Antibody; Future; Expected date: 05/07/2024  -     Cancel: HIV 1/2 Ag/Ab (4th Gen); Future; Expected date: 05/07/2024  -     Ambulatory referral/consult to Gynecology; Future; Expected date: 05/07/2024    2. Attention deficit disorder (ADD) without hyperactivity  Assessment & Plan:  Restart Vyvanse 40mg daily   Monitor for side effects ie abdominal pain, N/V, headaches, insomnia, weight loss, appetite suppression   Take frequent holidays      Orders:  -     lisdexamfetamine (VYVANSE) 40 MG Cap; Take 1 capsule (40 mg total) by mouth once daily.  Dispense: 30 capsule; Refill: 0    3. Seasonal allergic rhinitis due to pollen  Assessment & Plan:  Refill flonase 1 sq bid and singulair 10mg qhs     Orders:  -     fluticasone propionate (FLONASE) 50 mcg/actuation nasal spray; 1 spray (50 mcg total) by Each  Nostril route 2 (two) times daily as needed for Rhinitis.  Dispense: 16 g; Refill: 5  -     montelukast (SINGULAIR) 10 mg tablet; Take 1 tablet (10 mg total) by mouth every evening.  Dispense: 90 tablet; Refill: 3    4. Encounter for screening mammogram for malignant neoplasm of breast  -     Mammo Digital Screening Bilat w/ [facility]; Future; Expected date: 05/07/2024    5. Obesity (BMI 30.0-34.9)  Assessment & Plan:  Exercise 30 min 5 times per week, decrease portion sizes by 50%; encourage plant-based diet;  drink 6-8 glasses of water daily, avoid fast foods, creamy, rich foods joan ice cream, cheese creamy salad dressings;avoid eating 3 hours prior to bedtime; consider 8-10 hour intermittent diet; avoid simple sugars joan white bread, rice, potatoes, noodles/pasta; No sweetened drinks.       6. Gastroesophageal reflux disease without esophagitis  -     famotidine (PEPCID) 40 MG tablet; Take 1 tablet (40 mg total) by mouth 2 (two) times daily.  Dispense: 60 tablet; Refill: 5    7. Vitamin D deficiency  Assessment & Plan:  Check Vit D level     Orders:  -     Vitamin D; Future; Expected date: 05/07/2024    8. Glaucoma, unspecified glaucoma type, unspecified laterality  Assessment & Plan:  Refer to ophthlamology at [facility]    Orders:  -     Ambulatory referral/consult to Ophthalmology; Future; Expected date: 05/07/2024    9. Adjustment reaction with anxiety and depression  -     busPIRone (BUSPAR) 10 MG tablet; Take 1 tablet (10 mg total) by mouth 3 (three) times daily.  Dispense: 90 tablet; Refill: 11    10. Postmenopausal  -     DXA Bone Density Axial Skeleton 1 or more sites; Future; Expected date: 05/07/2024             I spent a total of 36 minutes on the day of the visit.This includes face to face time and non-face to face time preparing to see the patient (eg, review of tests), obtaining and/or reviewing separately obtained history, documenting clinical information in the electronic or other health record,  independently interpreting results and communicating results to the patient/family/caregiver, or care coordinator.   Code Status:     Josephine Underwood MD

## 2024-05-08 ENCOUNTER — LAB VISIT (OUTPATIENT)
Dept: LAB | Facility: OTHER | Age: 64
End: 2024-05-08
Attending: INTERNAL MEDICINE
Payer: COMMERCIAL

## 2024-05-08 DIAGNOSIS — Z00.00 PREVENTATIVE HEALTH CARE: ICD-10-CM

## 2024-05-08 DIAGNOSIS — E55.9 VITAMIN D DEFICIENCY: ICD-10-CM

## 2024-05-08 LAB
25(OH)D3+25(OH)D2 SERPL-MCNC: 51 NG/ML (ref 30–96)
ALBUMIN SERPL BCP-MCNC: 3.7 G/DL (ref 3.5–5.2)
ALP SERPL-CCNC: 68 U/L (ref 55–135)
ALT SERPL W/O P-5'-P-CCNC: 13 U/L (ref 10–44)
ANION GAP SERPL CALC-SCNC: 8 MMOL/L (ref 8–16)
AST SERPL-CCNC: 15 U/L (ref 10–40)
BASOPHILS # BLD AUTO: 0.05 K/UL (ref 0–0.2)
BASOPHILS NFR BLD: 1.1 % (ref 0–1.9)
BILIRUB SERPL-MCNC: 0.5 MG/DL (ref 0.1–1)
BUN SERPL-MCNC: 12 MG/DL (ref 8–23)
CALCIUM SERPL-MCNC: 9.1 MG/DL (ref 8.7–10.5)
CHLORIDE SERPL-SCNC: 112 MMOL/L (ref 95–110)
CHOLEST SERPL-MCNC: 219 MG/DL (ref 120–199)
CHOLEST/HDLC SERPL: 2.7 {RATIO} (ref 2–5)
CO2 SERPL-SCNC: 24 MMOL/L (ref 23–29)
CREAT SERPL-MCNC: 1.1 MG/DL (ref 0.5–1.4)
DIFFERENTIAL METHOD BLD: ABNORMAL
EOSINOPHIL # BLD AUTO: 0.1 K/UL (ref 0–0.5)
EOSINOPHIL NFR BLD: 1.1 % (ref 0–8)
ERYTHROCYTE [DISTWIDTH] IN BLOOD BY AUTOMATED COUNT: 12.6 % (ref 11.5–14.5)
EST. GFR  (NO RACE VARIABLE): 56 ML/MIN/1.73 M^2
ESTIMATED AVG GLUCOSE: 100 MG/DL (ref 68–131)
GLUCOSE SERPL-MCNC: 105 MG/DL (ref 70–110)
HBA1C MFR BLD: 5.1 % (ref 4–5.6)
HCT VFR BLD AUTO: 38 % (ref 37–48.5)
HDLC SERPL-MCNC: 80 MG/DL (ref 40–75)
HDLC SERPL: 36.5 % (ref 20–50)
HGB BLD-MCNC: 11.6 G/DL (ref 12–16)
IMM GRANULOCYTES # BLD AUTO: 0.01 K/UL (ref 0–0.04)
IMM GRANULOCYTES NFR BLD AUTO: 0.2 % (ref 0–0.5)
LDLC SERPL CALC-MCNC: 119.4 MG/DL (ref 63–159)
LYMPHOCYTES # BLD AUTO: 2.7 K/UL (ref 1–4.8)
LYMPHOCYTES NFR BLD: 58.9 % (ref 18–48)
MCH RBC QN AUTO: 28.9 PG (ref 27–31)
MCHC RBC AUTO-ENTMCNC: 30.5 G/DL (ref 32–36)
MCV RBC AUTO: 95 FL (ref 82–98)
MONOCYTES # BLD AUTO: 0.4 K/UL (ref 0.3–1)
MONOCYTES NFR BLD: 8.7 % (ref 4–15)
NEUTROPHILS # BLD AUTO: 1.4 K/UL (ref 1.8–7.7)
NEUTROPHILS NFR BLD: 30 % (ref 38–73)
NONHDLC SERPL-MCNC: 139 MG/DL
NRBC BLD-RTO: 0 /100 WBC
PLATELET # BLD AUTO: 271 K/UL (ref 150–450)
PMV BLD AUTO: 10 FL (ref 9.2–12.9)
POTASSIUM SERPL-SCNC: 4.3 MMOL/L (ref 3.5–5.1)
PROT SERPL-MCNC: 7.3 G/DL (ref 6–8.4)
RBC # BLD AUTO: 4.02 M/UL (ref 4–5.4)
SODIUM SERPL-SCNC: 144 MMOL/L (ref 136–145)
TRIGL SERPL-MCNC: 98 MG/DL (ref 30–150)
WBC # BLD AUTO: 4.6 K/UL (ref 3.9–12.7)

## 2024-05-08 PROCEDURE — 82306 VITAMIN D 25 HYDROXY: CPT | Performed by: INTERNAL MEDICINE

## 2024-05-08 PROCEDURE — 36415 COLL VENOUS BLD VENIPUNCTURE: CPT | Performed by: INTERNAL MEDICINE

## 2024-05-08 PROCEDURE — 80061 LIPID PANEL: CPT | Performed by: INTERNAL MEDICINE

## 2024-05-08 PROCEDURE — 80053 COMPREHEN METABOLIC PANEL: CPT | Performed by: INTERNAL MEDICINE

## 2024-05-08 PROCEDURE — 83036 HEMOGLOBIN GLYCOSYLATED A1C: CPT | Performed by: INTERNAL MEDICINE

## 2024-05-08 PROCEDURE — 85025 COMPLETE CBC W/AUTO DIFF WBC: CPT | Performed by: INTERNAL MEDICINE

## 2024-05-09 ENCOUNTER — PATIENT MESSAGE (OUTPATIENT)
Dept: PRIMARY CARE CLINIC | Facility: CLINIC | Age: 64
End: 2024-05-09
Payer: COMMERCIAL

## 2024-05-21 ENCOUNTER — HOSPITAL ENCOUNTER (OUTPATIENT)
Dept: RADIOLOGY | Facility: OTHER | Age: 64
Discharge: HOME | End: 2024-05-21
Attending: INTERNAL MEDICINE
Payer: COMMERCIAL

## 2024-05-21 DIAGNOSIS — Z12.31 ENCOUNTER FOR SCREENING MAMMOGRAM FOR MALIGNANT NEOPLASM OF BREAST: ICD-10-CM

## 2024-05-21 PROCEDURE — 77063 BREAST TOMOSYNTHESIS BI: CPT | Mod: 26,,, | Performed by: RADIOLOGY

## 2024-05-21 PROCEDURE — 77067 SCR MAMMO BI INCL CAD: CPT | Mod: 26,,, | Performed by: RADIOLOGY

## 2024-05-21 PROCEDURE — 77063 BREAST TOMOSYNTHESIS BI: CPT | Mod: TC

## 2024-05-25 ENCOUNTER — PATIENT MESSAGE (OUTPATIENT)
Dept: PRIMARY CARE CLINIC | Facility: CLINIC | Age: 64
End: 2024-05-25
Payer: COMMERCIAL

## 2024-06-07 ENCOUNTER — TELEPHONE (OUTPATIENT)
Dept: OPHTHALMOLOGY | Facility: CLINIC | Age: 64
End: 2024-06-07
Payer: COMMERCIAL

## 2024-08-12 PROBLEM — Z00.00 PREVENTATIVE HEALTH CARE: Status: RESOLVED | Noted: 2023-03-23 | Resolved: 2024-08-12

## 2024-09-09 DIAGNOSIS — F98.8 ATTENTION DEFICIT DISORDER (ADD) WITHOUT HYPERACTIVITY: ICD-10-CM

## 2024-09-09 RX ORDER — LISDEXAMFETAMINE DIMESYLATE 40 MG/1
40 CAPSULE ORAL DAILY
Qty: 30 CAPSULE | Refills: 0 | Status: SHIPPED | OUTPATIENT
Start: 2024-09-09

## 2024-10-07 ENCOUNTER — PATIENT MESSAGE (OUTPATIENT)
Dept: PRIMARY CARE CLINIC | Facility: CLINIC | Age: 64
End: 2024-10-07
Payer: COMMERCIAL

## 2024-10-08 ENCOUNTER — OFFICE VISIT (OUTPATIENT)
Dept: PRIMARY CARE CLINIC | Facility: CLINIC | Age: 64
End: 2024-10-08
Payer: COMMERCIAL

## 2024-10-08 DIAGNOSIS — Z00.00 PREVENTATIVE HEALTH CARE: ICD-10-CM

## 2024-10-08 DIAGNOSIS — H40.9 GLAUCOMA, UNSPECIFIED GLAUCOMA TYPE, UNSPECIFIED LATERALITY: ICD-10-CM

## 2024-10-08 DIAGNOSIS — F98.8 ATTENTION DEFICIT DISORDER (ADD) WITHOUT HYPERACTIVITY: Primary | ICD-10-CM

## 2024-10-08 PROCEDURE — 99213 OFFICE O/P EST LOW 20 MIN: CPT | Mod: 95,,, | Performed by: INTERNAL MEDICINE

## 2024-10-08 PROCEDURE — G2211 COMPLEX E/M VISIT ADD ON: HCPCS | Mod: 95,,, | Performed by: INTERNAL MEDICINE

## 2024-10-08 PROCEDURE — 3044F HG A1C LEVEL LT 7.0%: CPT | Mod: CPTII,95,, | Performed by: INTERNAL MEDICINE

## 2024-10-08 RX ORDER — LISDEXAMFETAMINE DIMESYLATE 40 MG/1
40 CAPSULE ORAL DAILY
Qty: 30 CAPSULE | Refills: 0 | Status: SHIPPED | OUTPATIENT
Start: 2024-10-08

## 2024-10-08 NOTE — PROGRESS NOTES
[facility] Progress Note      Audiovisual Visit  Location:  [place], [place]      Chief Complaint     No chief complaint on file.   for ADHD    Subjective:      History of Present Illness:  Ana Lynch is a 64 y.o. female     ADHD: needs refill of Vyvanse 40mg prn; her only side effect is dry mouth for which she sucks on lozenges or chews gum, drinks water.   She denies abdominal pain, N/V, headaches, insomnia, weight loss, appetite suppression. She only takes Vyvanse 2-3 times per week.       Glaucoma: Needs referral to ophthalmology  Routine Pap smear: needs referral  to GYN     Past Medical History:  No past medical history on file.    Past Surgical History:  No past surgical history on file.    Allergies:  Review of patient's allergies indicates:  No Known Allergies    Home Medications:  Current Outpatient Medications   Medication Sig Dispense Refill    buPROPion (WELLBUTRIN XL) 300 MG 24 hr tablet TAKE 1 TABLET(300 MG) BY MOUTH EVERY DAY 30 tablet 11    busPIRone (BUSPAR) 10 MG tablet Take 1 tablet (10 mg total) by mouth 3 (three) times daily. 90 tablet 11    cetirizine (ZYRTEC) 10 MG tablet Take 1 tablet (10 mg total) by mouth once daily. 10 tablet 0    famotidine (PEPCID) 40 MG tablet Take 1 tablet (40 mg total) by mouth 2 (two) times daily. 60 tablet 5    fluconazole (DIFLUCAN) 150 MG Tab Take 1 tab po now and repeat in 4 days 2 tablet 3    fluticasone propionate (FLONASE) 50 mcg/actuation nasal spray 1 spray (50 mcg total) by Each Nostril route 2 (two) times daily as needed for Rhinitis. 16 g 5    latanoprost 0.005 % ophthalmic solution Place into both eyes.      lisdexamfetamine (VYVANSE) 40 MG Cap Take 1 capsule (40 mg total) by mouth once daily. 30 capsule 0    montelukast (SINGULAIR) 10 mg tablet Take 1 tablet (10 mg total) by mouth every evening. 90 tablet 3    timolol maleate 0.5% (TIMOPTIC) 0.5 % Drop        No current facility-administered medications for this visit.         Family History:  No family history on file.    Social History:  Social History     Tobacco Use    Smoking status: Never    Smokeless tobacco: Never   Substance Use Topics    Alcohol use: Never    Drug use: Never       Review of Systems:  Pertinent positives and negatives listed in HPI. All other systems are reviewed and are negative.    Health Maintaince :   Health Maintenance Topics with due status: Not Due       Topic Last Completion Date    Colorectal Cancer Screening 04/28/2023    Hemoglobin A1c (Diabetic Prevention Screening) 05/08/2024    Lipid Panel 05/08/2024    Mammogram 05/21/2024    RSV Vaccine (Age 60+ and Pregnant patients) Not Due           Eye:   Dental:     Immunizations:     The 10-year ASCVD risk score (Anne SINGH, et al., 2019) is: 7.1%    Values used to calculate the score:      Age: 64 years      Sex: Female      Is Non- : Yes      Diabetic: No      Tobacco smoker: No      Systolic Blood Pressure: 128 mmHg      Is BP treated: No      HDL Cholesterol: 80 mg/dL      Total Cholesterol: 219 mg/dL      Objective:   LMP  (LMP Unknown)      There is no height or weight on file to calculate BMI.       Physical Examination:  General: Alert and awake in no apparent distress  Neuro:  AAOx3; cooperative and pleasant with no focal deficits    Laboratory:      Most Recent Data:  Lab Results   Component Value Date    WBC 4.60 05/08/2024    HGB 11.6 (L) 05/08/2024    HCT 38.0 05/08/2024     05/08/2024    CHOL 219 (H) 05/08/2024    TRIG 98 05/08/2024    HDL 80 (H) 05/08/2024    ALT 13 05/08/2024    AST 15 05/08/2024     05/08/2024    K 4.3 05/08/2024     (H) 05/08/2024    BUN 12 05/08/2024    CO2 24 05/08/2024    HGBA1C 5.1 05/08/2024              CBC:   WBC   Date Value Ref Range Status   05/08/2024 4.60 3.90 - 12.70 K/uL Final     Hemoglobin   Date Value Ref Range Status   05/08/2024 11.6 (L) 12.0 - 16.0 g/dL Final     Hematocrit   Date Value Ref Range Status  "  05/08/2024 38.0 37.0 - 48.5 % Final     Platelets   Date Value Ref Range Status   05/08/2024 271 150 - 450 K/uL Final     MCV   Date Value Ref Range Status   05/08/2024 95 82 - 98 fL Final     RDW   Date Value Ref Range Status   05/08/2024 12.6 11.5 - 14.5 % Final     BMP:   Sodium   Date Value Ref Range Status   05/08/2024 144 136 - 145 mmol/L Final     Potassium   Date Value Ref Range Status   05/08/2024 4.3 3.5 - 5.1 mmol/L Final     Chloride   Date Value Ref Range Status   05/08/2024 112 (H) 95 - 110 mmol/L Final     CO2   Date Value Ref Range Status   05/08/2024 24 23 - 29 mmol/L Final     BUN   Date Value Ref Range Status   05/08/2024 12 8 - 23 mg/dL Final     Creatinine   Date Value Ref Range Status   05/08/2024 1.1 0.5 - 1.4 mg/dL Final     Glucose   Date Value Ref Range Status   05/08/2024 105 70 - 110 mg/dL Final     Calcium   Date Value Ref Range Status   05/08/2024 9.1 8.7 - 10.5 mg/dL Final     LFTs:   Total Protein   Date Value Ref Range Status   05/08/2024 7.3 6.0 - 8.4 g/dL Final     Albumin   Date Value Ref Range Status   05/08/2024 3.7 3.5 - 5.2 g/dL Final     Total Bilirubin   Date Value Ref Range Status   05/08/2024 0.5 0.1 - 1.0 mg/dL Final     Comment:     For infants and newborns, interpretation of results should be based  on gestational age, weight and in agreement with clinical  observations.    Premature Infant recommended reference ranges:  Up to 24 hours.............<8.0 mg/dL  Up to 48 hours............<12.0 mg/dL  3-5 days..................<15.0 mg/dL  6-29 days.................<15.0 mg/dL       AST   Date Value Ref Range Status   05/08/2024 15 10 - 40 U/L Final     Alkaline Phosphatase   Date Value Ref Range Status   05/08/2024 68 55 - 135 U/L Final     ALT   Date Value Ref Range Status   05/08/2024 13 10 - 44 U/L Final     Coags: No results found for: "INR", "PROTIME", "PTT"  FLP:      Lab Results   Component Value Date    CHOL 219 (H) 05/08/2024    CHOL 252 (H) 03/23/2023     Lab " "Results   Component Value Date    HDL 80 (H) 05/08/2024    HDL 78 (H) 03/23/2023     Lab Results   Component Value Date    LDLCALC 119.4 05/08/2024    LDLCALC 140.6 03/23/2023     Lab Results   Component Value Date    TRIG 98 05/08/2024    TRIG 167 (H) 03/23/2023     Lab Results   Component Value Date    CHOLHDL 36.5 05/08/2024    CHOLHDL 31.0 03/23/2023      DM:      Hemoglobin A1C   Date Value Ref Range Status   05/08/2024 5.1 4.0 - 5.6 % Final     Comment:     ADA Screening Guidelines:  5.7-6.4%  Consistent with prediabetes  >or=6.5%  Consistent with diabetes    High levels of fetal hemoglobin interfere with the HbA1C  assay. Heterozygous hemoglobin variants (HbS, HgC, etc)do  not significantly interfere with this assay.   However, presence of multiple variants may affect accuracy.     03/23/2023 5.0 4.0 - 5.6 % Final     Comment:     ADA Screening Guidelines:  5.7-6.4%  Consistent with prediabetes  >or=6.5%  Consistent with diabetes    High levels of fetal hemoglobin interfere with the HbA1C  assay. Heterozygous hemoglobin variants (HbS, HgC, etc)do  not significantly interfere with this assay.   However, presence of multiple variants may affect accuracy.       LDL Cholesterol   Date Value Ref Range Status   05/08/2024 119.4 63.0 - 159.0 mg/dL Final     Comment:     The National Cholesterol Education Program (NCEP) has set the  following guidelines (reference values) for LDL Cholesterol:  Optimal.......................<130 mg/dL  Borderline High...............130-159 mg/dL  High..........................160-189 mg/dL  Very High.....................>190 mg/dL       Creatinine   Date Value Ref Range Status   05/08/2024 1.1 0.5 - 1.4 mg/dL Final     Thyroid: No results found for: "TSH", "FREET4", "E5LPSJU", "O1JJBTD", "THYROIDAB"  Anemia: No results found for: "IRON", "TIBC", "FERRITIN", "GMXEBHOS89", "FOLATE"  Cardiac:   Troponin I   Date Value Ref Range Status   03/29/2019 <0.006 0.000 - 0.026 ng/mL Final     " Comment:     The reference interval for Troponin I represents the 99th percentile   cutoff   for our facility and is consistent with 3rd generation assay   performance.       BNP   Date Value Ref Range Status   03/29/2019 13 0 - 99 pg/mL Final     Comment:     Values of less than 100 pg/ml are consistent with non-CHF populations.     Urinalysis:   Color, UA   Date Value Ref Range Status   05/08/2024 Yellow Yellow, Straw, Suzanna Final     Specific Gravity, UA   Date Value Ref Range Status   05/08/2024 1.020 1.005 - 1.030 Final     Nitrite, UA   Date Value Ref Range Status   05/08/2024 Negative Negative Final     Ketones, UA   Date Value Ref Range Status   05/08/2024 Negative Negative Final     Urobilinogen, UA   Date Value Ref Range Status   05/08/2024 Negative <2.0 EU/dL Final     WBC, UA   Date Value Ref Range Status   05/08/2024 1 0 - 5 /hpf Final       Other Results:  EKG (my interpretation):     Radiology:  Mammo Digital Screening Bilat w/ Adarsh  Narrative: Result:  Mammo Digital Screening Bilat w/ Adarsh    History:  Patient is 63 y.o. and is seen for a screening mammogram.    Films Compared:  Compared to: 01/26/2022 Mammo Digital Screening Bilat and 09/02/2020 Mammo   Digital Screening Bilat     Findings:  This procedure was performed using tomosynthesis.   Computer-aided detection was utilized in the interpretation of this   examination.    The breasts are almost entirely fatty. There is no evidence of suspicious   masses, microcalcifications or architectural distortion.  Impression:    No mammographic evidence of malignancy.    BI-RADS Category 1: Negative    Recommendation:  Routine screening mammogram in 1 year is recommended.    Your estimated lifetime risk of breast cancer (to age 85) based on   Tyrer-Cuzick risk assessment model is 4.27%.  According to the American   Cancer Society, patients with a lifetime breast cancer risk of 20% or   higher might benefit from supplemental screening tests, such as  screening   breast MRI.          Assessment/Plan     Ana Lynch is a 64 y.o. female with:  1. Attention deficit disorder (ADD) without hyperactivity  Assessment & Plan:  Restart Vyvanse 40mg daily   Monitor for side effects ie abdominal pain, N/V, headaches, insomnia, weight loss, appetite suppression   Take frequent holidays     Hydrate well, suck on sour gum or lozenges due to dry mout associated with the Vyvanse.     In-person visit in 3 months      Orders:  -     lisdexamfetamine (VYVANSE) 40 MG Cap; Take 1 capsule (40 mg total) by mouth once daily.  Dispense: 30 capsule; Refill: 0    2. Glaucoma, unspecified glaucoma type, unspecified laterality  Assessment & Plan:  Refer to ophthalmology at [facility]    Orders:  -     Cancel: Ambulatory referral/consult to Ophthalmology; Future; Expected date: 10/08/2024  -     Ambulatory referral/consult to Ophthalmology; Future; Expected date: 10/08/2024    3. Preventative health care  Assessment & Plan:  Refer to GYN at [address]    Orders:  -     Ambulatory referral/consult to Gynecology; Future; Expected date: 10/08/2024    Other orders  -     Cancel: Ambulatory referral/consult to Gynecology; Future; Expected date: 10/08/2024             This includes face to face time and non-face to face time preparing to see the patient (eg, review of tests), obtaining and/or reviewing separately obtained history, documenting clinical information in the electronic or other health record, independently interpreting results and communicating results to the patient/family/caregiver, or care coordinator.   Code Status:     Josephine Underwood MD History of Present Illness

## 2024-10-08 NOTE — ASSESSMENT & PLAN NOTE
Restart Vyvanse 40mg daily   Monitor for side effects ie abdominal pain, N/V, headaches, insomnia, weight loss, appetite suppression   Take frequent holidays     Hydrate well, suck on sour gum or lozenges due to dry mout associated with the Vyvanse.     In-person visit in 3 months

## 2024-11-24 DIAGNOSIS — J30.1 SEASONAL ALLERGIC RHINITIS DUE TO POLLEN: ICD-10-CM

## 2024-11-24 RX ORDER — FLUTICASONE PROPIONATE 50 MCG
SPRAY, SUSPENSION (ML) NASAL
Qty: 48 G | Refills: 3 | Status: SHIPPED | OUTPATIENT
Start: 2024-11-24

## 2024-11-24 NOTE — TELEPHONE ENCOUNTER
No care due was identified.  Health [facility] Embedded Care Due Messages. Reference number: [id number].   11/24/2024 12:41:09 PM CST

## 2024-11-25 NOTE — TELEPHONE ENCOUNTER
Ana Lynch  is requesting a refill authorization.  Brief Assessment and Rationale for Refill:  Approve     Medication Therapy Plan:         Comments:     Note composed:6:58 PM 11/24/2024

## 2024-12-09 DIAGNOSIS — K21.9 GASTROESOPHAGEAL REFLUX DISEASE WITHOUT ESOPHAGITIS: ICD-10-CM

## 2024-12-09 RX ORDER — FAMOTIDINE 40 MG/1
40 TABLET, FILM COATED ORAL 2 TIMES DAILY
Qty: 180 TABLET | Refills: 1 | Status: SHIPPED | OUTPATIENT
Start: 2024-12-09

## 2024-12-09 NOTE — TELEPHONE ENCOUNTER
Refill Routing Note   Medication(s) are not appropriate for processing by [facility] for the following reason(s):        Required labs abnormal    [facility] action(s):  Defer             Pharmacist review requested: Yes     Appointments  past 12m or future 3m with PCP    Date Provider   Last Visit   10/8/2024 Josephine Underwood MD   Next Visit   Visit date not found Josephine Underwood MD   ED visits in past 90 days: 0        Note composed:3:12 PM 12/09/2024

## 2024-12-09 NOTE — TELEPHONE ENCOUNTER
Refill Routing Note   Medication(s) are not appropriate for processing by [facility] for the following reason(s):        Required labs abnormal    [facility] action(s):  Defer             Pharmacist review requested: Yes     Appointments  past 12m or future 3m with PCP    Date Provider   Last Visit   10/8/2024 Josephine Underwood MD   Next Visit   Visit date not found Josephine Underwood MD   ED visits in past 90 days: 0        Note composed:3:38 PM 12/09/2024

## 2024-12-09 NOTE — TELEPHONE ENCOUNTER
No care due was identified.  Health [facility] Embedded Care Due Messages. Reference number: [id number].   12/09/2024 3:16:59 AM CST

## 2025-04-29 DIAGNOSIS — F32.5 MAJOR DEPRESSIVE DISORDER IN FULL REMISSION, UNSPECIFIED WHETHER RECURRENT: ICD-10-CM

## 2025-04-29 RX ORDER — BUPROPION HYDROCHLORIDE 300 MG/1
300 TABLET ORAL
Qty: 90 TABLET | Refills: 0 | Status: SHIPPED | OUTPATIENT
Start: 2025-04-29

## 2025-04-29 NOTE — TELEPHONE ENCOUNTER
Care Due:                  Date            Visit Type   Department     Provider  --------------------------------------------------------------------------------                                ESTABLISHED                              PATIENT -    [facility]  Last Visit: 10-      VIRTUAL      CARE           Josephine Underwood  Next Visit: None Scheduled  None         None Found                                                            Last  Test          Frequency    Reason                     Performed    Due Date  --------------------------------------------------------------------------------    Cr..........  12 months..  famotidine...............  05- 05-    [facility] Embedded Care Due Messages. Reference number: [id number].   4/29/2025 3:17:30 AM CDT

## 2025-04-29 NOTE — TELEPHONE ENCOUNTER
Provider Staff:  Action required for this patient    Requires labs      Please see care gap opportunities below in Care Due Message.    Thanks!  [facility]     Appointments      Date Provider   Last Visit   10/8/2024 Josephine Underwood MD   Next Visit   Visit date not found Josephine Underwood MD     Refill Decision Note   Ana Oliveiraer  is requesting a refill authorization.  Brief Assessment and Rationale for Refill:  Approve     Medication Therapy Plan:         Comments:     Note composed:6:48 AM 04/29/2025

## 2025-07-16 ENCOUNTER — PATIENT MESSAGE (OUTPATIENT)
Dept: ADMINISTRATIVE | Facility: HOSPITAL | Age: 65
End: 2025-07-16
Payer: COMMERCIAL

## 2025-07-17 ENCOUNTER — PATIENT OUTREACH (OUTPATIENT)
Dept: ADMINISTRATIVE | Facility: HOSPITAL | Age: 65
End: 2025-07-17
Payer: COMMERCIAL

## 2025-07-17 NOTE — LETTER
AUTHORIZATION FOR RELEASE OF   CONFIDENTIAL INFORMATION        We are seeing Ana Lynch, date of birth 1960, in the clinic at [facility]. Josephine Underwood MD is the patient's PCP. Ana Lynch has an outstanding lab/procedure at the time we reviewed her chart. In order to help keep her health information updated, she has authorized us to request the following medical record(s):        (  )  MAMMOGRAM                                      (  x)  COLONOSCOPY      (  )  PAP SMEAR                                          (  )  OUTSIDE LAB RESULTS     (  )  DEXA SCAN                                          (  )  EYE EXAM            (  )  FOOT EXAM                                          (  )  ENTIRE RECORD     (  )  OUTSIDE IMMUNIZATIONS                 (  )  _______________         Please fax records to [facility], Lo, Betty Peyti, MD,  at [phone] or email to [email or web address].       If you have any questions, please contact Ana at [phone].           Patient Name: Ana Lynch  : 1960  Patient Phone #: [phone]

## 2025-07-21 ENCOUNTER — PATIENT MESSAGE (OUTPATIENT)
Dept: PRIMARY CARE CLINIC | Facility: CLINIC | Age: 65
End: 2025-07-21
Payer: COMMERCIAL

## 2025-07-22 ENCOUNTER — OFFICE VISIT (OUTPATIENT)
Dept: INTERNAL MEDICINE | Facility: CLINIC | Age: 65
End: 2025-07-22
Payer: COMMERCIAL

## 2025-07-22 ENCOUNTER — LAB VISIT (OUTPATIENT)
Dept: LAB | Facility: HOSPITAL | Age: 65
End: 2025-07-22
Attending: INTERNAL MEDICINE
Payer: COMMERCIAL

## 2025-07-22 VITALS
HEIGHT: 65 IN | BODY MASS INDEX: 30.35 KG/M2 | DIASTOLIC BLOOD PRESSURE: 74 MMHG | SYSTOLIC BLOOD PRESSURE: 142 MMHG | WEIGHT: 182.19 LBS | OXYGEN SATURATION: 98 % | HEART RATE: 85 BPM

## 2025-07-22 DIAGNOSIS — R51.9 INTRACTABLE HEADACHE, UNSPECIFIED CHRONICITY PATTERN, UNSPECIFIED HEADACHE TYPE: ICD-10-CM

## 2025-07-22 DIAGNOSIS — Z00.00 WELLNESS EXAMINATION: Primary | ICD-10-CM

## 2025-07-22 DIAGNOSIS — H93.11 TINNITUS, RIGHT EAR: ICD-10-CM

## 2025-07-22 DIAGNOSIS — Z00.00 WELLNESS EXAMINATION: ICD-10-CM

## 2025-07-22 LAB
25(OH)D3+25(OH)D2 SERPL-MCNC: 37 NG/ML (ref 30–96)
ABSOLUTE EOSINOPHIL (OHS): 0.05 K/UL
ABSOLUTE MONOCYTE (OHS): 0.44 K/UL (ref 0.3–1)
ABSOLUTE NEUTROPHIL COUNT (OHS): 1.99 K/UL (ref 1.8–7.7)
ALBUMIN SERPL BCP-MCNC: 4.1 G/DL (ref 3.5–5.2)
ALP SERPL-CCNC: 76 UNIT/L (ref 40–150)
ALT SERPL W/O P-5'-P-CCNC: 14 UNIT/L (ref 10–44)
ANION GAP (OHS): 11 MMOL/L (ref 8–16)
AST SERPL-CCNC: 17 UNIT/L (ref 11–45)
BASOPHILS # BLD AUTO: 0.04 K/UL
BASOPHILS NFR BLD AUTO: 0.8 %
BILIRUB SERPL-MCNC: 0.4 MG/DL (ref 0.1–1)
BUN SERPL-MCNC: 12 MG/DL (ref 8–23)
CALCIUM SERPL-MCNC: 9.3 MG/DL (ref 8.7–10.5)
CHLORIDE SERPL-SCNC: 109 MMOL/L (ref 95–110)
CHOLEST SERPL-MCNC: 252 MG/DL (ref 120–199)
CHOLEST/HDLC SERPL: 3.2 {RATIO} (ref 2–5)
CO2 SERPL-SCNC: 21 MMOL/L (ref 23–29)
CREAT SERPL-MCNC: 1.1 MG/DL (ref 0.5–1.4)
CRP SERPL-MCNC: 4.2 MG/L
EAG (OHS): 97 MG/DL (ref 68–131)
ERYTHROCYTE [DISTWIDTH] IN BLOOD BY AUTOMATED COUNT: 12.5 % (ref 11.5–14.5)
ERYTHROCYTE [SEDIMENTATION RATE] IN BLOOD BY PHOTOMETRIC METHOD: 20 MM/HR
GFR SERPLBLD CREATININE-BSD FMLA CKD-EPI: 56 ML/MIN/1.73/M2
GLUCOSE SERPL-MCNC: 90 MG/DL (ref 70–110)
HBA1C MFR BLD: 5 % (ref 4–5.6)
HCT VFR BLD AUTO: 37.8 % (ref 37–48.5)
HDLC SERPL-MCNC: 78 MG/DL (ref 40–75)
HDLC SERPL: 31 % (ref 20–50)
HGB BLD-MCNC: 11.9 GM/DL (ref 12–16)
IMM GRANULOCYTES # BLD AUTO: 0.02 K/UL (ref 0–0.04)
IMM GRANULOCYTES NFR BLD AUTO: 0.4 % (ref 0–0.5)
LDLC SERPL CALC-MCNC: 148 MG/DL (ref 63–159)
LYMPHOCYTES # BLD AUTO: 2.69 K/UL (ref 1–4.8)
MCH RBC QN AUTO: 29.4 PG (ref 27–31)
MCHC RBC AUTO-ENTMCNC: 31.5 G/DL (ref 32–36)
MCV RBC AUTO: 93 FL (ref 82–98)
NONHDLC SERPL-MCNC: 174 MG/DL
NUCLEATED RBC (/100WBC) (OHS): 0 /100 WBC
PLATELET # BLD AUTO: 320 K/UL (ref 150–450)
PMV BLD AUTO: 10.2 FL (ref 9.2–12.9)
POTASSIUM SERPL-SCNC: 4 MMOL/L (ref 3.5–5.1)
PROT SERPL-MCNC: 7.9 GM/DL (ref 6–8.4)
RBC # BLD AUTO: 4.05 M/UL (ref 4–5.4)
RELATIVE EOSINOPHIL (OHS): 1 %
RELATIVE LYMPHOCYTE (OHS): 51.4 % (ref 18–48)
RELATIVE MONOCYTE (OHS): 8.4 % (ref 4–15)
RELATIVE NEUTROPHIL (OHS): 38 % (ref 38–73)
SODIUM SERPL-SCNC: 141 MMOL/L (ref 136–145)
TRIGL SERPL-MCNC: 130 MG/DL (ref 30–150)
TSH SERPL-ACNC: 0.65 UIU/ML (ref 0.4–4)
WBC # BLD AUTO: 5.23 K/UL (ref 3.9–12.7)

## 2025-07-22 PROCEDURE — 3008F BODY MASS INDEX DOCD: CPT | Mod: CPTII,S$GLB,, | Performed by: INTERNAL MEDICINE

## 2025-07-22 PROCEDURE — 84443 ASSAY THYROID STIM HORMONE: CPT

## 2025-07-22 PROCEDURE — 99214 OFFICE O/P EST MOD 30 MIN: CPT | Mod: S$GLB,,, | Performed by: INTERNAL MEDICINE

## 2025-07-22 PROCEDURE — 86140 C-REACTIVE PROTEIN: CPT

## 2025-07-22 PROCEDURE — 85652 RBC SED RATE AUTOMATED: CPT

## 2025-07-22 PROCEDURE — 82465 ASSAY BLD/SERUM CHOLESTEROL: CPT

## 2025-07-22 PROCEDURE — 3044F HG A1C LEVEL LT 7.0%: CPT | Mod: CPTII,S$GLB,, | Performed by: INTERNAL MEDICINE

## 2025-07-22 PROCEDURE — 85025 COMPLETE CBC W/AUTO DIFF WBC: CPT

## 2025-07-22 PROCEDURE — 36415 COLL VENOUS BLD VENIPUNCTURE: CPT | Mod: PO

## 2025-07-22 PROCEDURE — 3077F SYST BP >= 140 MM HG: CPT | Mod: CPTII,S$GLB,, | Performed by: INTERNAL MEDICINE

## 2025-07-22 PROCEDURE — 1159F MED LIST DOCD IN RCRD: CPT | Mod: CPTII,S$GLB,, | Performed by: INTERNAL MEDICINE

## 2025-07-22 PROCEDURE — 99999 PR PBB SHADOW E&M-EST. PATIENT-LVL IV: CPT | Mod: PBBFAC,,, | Performed by: INTERNAL MEDICINE

## 2025-07-22 PROCEDURE — 82306 VITAMIN D 25 HYDROXY: CPT

## 2025-07-22 PROCEDURE — 3078F DIAST BP <80 MM HG: CPT | Mod: CPTII,S$GLB,, | Performed by: INTERNAL MEDICINE

## 2025-07-22 PROCEDURE — 83036 HEMOGLOBIN GLYCOSYLATED A1C: CPT

## 2025-07-22 PROCEDURE — 84295 ASSAY OF SERUM SODIUM: CPT

## 2025-07-22 RX ORDER — PREDNISONE 20 MG/1
20 TABLET ORAL DAILY
Qty: 5 TABLET | Refills: 0 | Status: SHIPPED | OUTPATIENT
Start: 2025-07-22

## 2025-07-26 ENCOUNTER — TELEPHONE (OUTPATIENT)
Dept: INTERNAL MEDICINE | Facility: CLINIC | Age: 65
End: 2025-07-26
Payer: COMMERCIAL

## 2025-07-26 DIAGNOSIS — R51.9 INTRACTABLE HEADACHE, UNSPECIFIED CHRONICITY PATTERN, UNSPECIFIED HEADACHE TYPE: Primary | ICD-10-CM

## 2025-07-27 NOTE — PROGRESS NOTES
Subjective:       Patient ID: Ana Lynch is a 64 y.o. female.    Chief Complaint: Follow-up, Headache, and Fatigue    Follow-up  Associated symptoms include fatigue and headaches. Pertinent negatives include no abdominal pain, chest pain (arm pain or jaw pain), nausea or vomiting.   Headache   Pertinent negatives include no abdominal pain, nausea, seizures or vomiting.   Fatigue  Associated symptoms include fatigue and headaches. Pertinent negatives include no abdominal pain, chest pain (arm pain or jaw pain), nausea or vomiting.    Pt with about a month of daily intractable HA unrelieved by OTC meds.  Has a history of HA but never this persistent.  No visual disturbance.  No N/V.  No scalp or jaw pain.  Some neck pain.  Recently her mother passed away.    Review of Systems   Constitutional:  Positive for fatigue.   Respiratory:  Negative for shortness of breath (PND or orthopnea).    Cardiovascular:  Negative for chest pain (arm pain or jaw pain).   Gastrointestinal:  Negative for abdominal pain, diarrhea, nausea and vomiting.   Genitourinary:  Negative for dysuria.   Neurological:  Positive for headaches. Negative for seizures and syncope.       Objective:      Physical Exam  Constitutional:       General: She is not in acute distress.     Appearance: She is well-developed.   HENT:      Head: Normocephalic.   Eyes:      Pupils: Pupils are equal, round, and reactive to light.   Neck:      Thyroid: No thyromegaly.      Vascular: No JVD.   Cardiovascular:      Rate and Rhythm: Normal rate and regular rhythm.      Heart sounds: Normal heart sounds. No murmur heard.     No friction rub. No gallop.   Pulmonary:      Effort: Pulmonary effort is normal.      Breath sounds: Normal breath sounds. No wheezing or rales.   Abdominal:      General: Bowel sounds are normal. There is no distension.      Palpations: Abdomen is soft. There is no mass.      Tenderness: There is no abdominal tenderness. There is no guarding or  rebound.   Musculoskeletal:      Cervical back: Neck supple.   Lymphadenopathy:      Cervical: No cervical adenopathy.   Skin:     General: Skin is warm and dry.   Neurological:      Mental Status: She is alert and oriented to person, place, and time.      Cranial Nerves: No cranial nerve deficit.      Motor: No weakness.      Coordination: Coordination normal.      Gait: Gait normal.      Deep Tendon Reflexes: Reflexes are normal and symmetric.   Psychiatric:         Behavior: Behavior normal.         Thought Content: Thought content normal.         Judgment: Judgment normal.         Assessment:       1. Wellness examination    2. Intractable headache, unspecified chronicity pattern, unspecified headache type    3. Tinnitus, right ear        Plan:   Wellness examination  -     CBC Auto Differential; Future; Expected date: 07/22/2025  -     Comprehensive Metabolic Panel; Future; Expected date: 07/22/2025  -     Lipid Panel; Future; Expected date: 07/22/2025  -     TSH; Future; Expected date: 07/22/2025  -     Hemoglobin A1C; Future; Expected date: 07/22/2025  -     Vitamin D; Future; Expected date: 07/22/2025    Intractable headache, unspecified chronicity pattern, unspecified headache type  -     Sedimentation rate; Future; Expected date: 07/22/2025  -     C-Reactive Protein; Future; Expected date: 07/22/2025  -     CT Head Without Contrast; Future; Expected date: 07/22/2025    Tinnitus, right ear  -     US Carotid Bilateral; Future; Expected date: 07/22/2025    Other orders  -     predniSONE (DELTASONE) 20 MG tablet; Take 1 tablet (20 mg total) by mouth once daily.  Dispense: 5 tablet; Refill: 0

## 2025-08-05 ENCOUNTER — PATIENT MESSAGE (OUTPATIENT)
Dept: PRIMARY CARE CLINIC | Facility: CLINIC | Age: 65
End: 2025-08-05
Payer: COMMERCIAL

## 2025-08-05 NOTE — PROGRESS NOTES
[facility]/Pediatrics Progress Note      Chief Complaint     Annual Exam      Subjective:      History of Present Illness:  Ana Lynch is a 64 y.o. female here for annual PE     ADD: would like refill of higher dose of Vyvanse since Vyvanse 40mg not as effective;  does take holidays; has been out x 2 months and has difficulty staying focused at work as the  of the [employer]    Insomnia: has poor quality of sleep      Glaucoma: needs to make appt with eye MD; taking Latanoprost and timolol     Allergic rhinitis: now with post-nasal drip but has not been taking Singulair; needs refill flonase bid;  not doing saline irrigation     AR:  needs refill of flonase and Zyrtec; needs refill of singulair      Postmenopausal x 6 years: needs DEXA      Depression/Anxiety: stable on Wellbutrin XL 300mg daily and vistaril 1/2 tab po qhs prn causes grogginess upon awakening; uses buspar 10mg daily   -PHQ-2  Feels her anxiety is poorly controlled but would feel better if she restarted Vyvanse      Obesity: needs to start exercising to cont to lose weight     GERD: no longer taking PPI and rarely uses pepcid       Sedentary: not exercising as much because 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    Allergies:  Review of patient's allergies indicates:  No Known Allergies    Home Medications:  Current Outpatient Medications   Medication Sig Dispense Refill    buPROPion (WELLBUTRIN XL) 300 MG 24 hr tablet TAKE 1 TABLET(300 MG) BY MOUTH  EVERY DAY 90 tablet 0    famotidine (PEPCID) 40 MG tablet TAKE 1 TABLET(40 MG) BY MOUTH TWICE DAILY 180 tablet 1    fluticasone propionate (FLONASE) 50 mcg/actuation nasal spray SHAKE LIQUID AND USE 1 SPRAY(50 MCG) IN EACH NOSTRIL TWICE DAILY AS NEEDED FOR RHINITIS 48 g 3    latanoprost 0.005 % ophthalmic solution Place into both eyes.      lisdexamfetamine (VYVANSE) 40 MG Cap Take 1 capsule (40 mg total) by mouth once daily. 30 capsule 0    montelukast (SINGULAIR) 10 mg tablet Take 1 tablet (10 mg total) by mouth every evening. 90 tablet 3    timolol maleate 0.5% (TIMOPTIC) 0.5 % Drop       busPIRone (BUSPAR) 10 MG tablet Take 1 tablet (10 mg total) by mouth 3 (three) times daily. 90 tablet 11     No current facility-administered medications for this visit.        Family History:  No family history on file.    Social History:  Social History     Tobacco Use    Smoking status: Never    Smokeless tobacco: Never   Substance Use Topics    Alcohol use: Never    Drug use: Never       Review of Systems:  Pertinent positives and negatives listed in HPI. All other systems are reviewed and are negative.    Health Maintaince :   Health Maintenance Topics with due status: Not Due       Topic Last Completion Date    Influenza Vaccine 11/29/2021    Colorectal Cancer Screening 04/28/2023    Hemoglobin A1c (Diabetic Prevention Screening) 07/22/2025    Lipid Panel 07/22/2025    RSV Vaccine (Age 60+ and Pregnant patients) Not Due           Eye: needs name of specialist   Dental: UTD     Immunizations:   Tdap: n/a.  Influenza: needs.  Pneumovax: n/a  Shingrex x 2: needs  COVID: needs  Hepatitis C:   Cancer Screening:  PAP: UTD needs   Mammogram: needs to reschedule   Colonoscopy: UTD 4/28/2033  DEXA:  needs   LDCT: n/a    The 10-year ASCVD risk score (Anne SINGH, et al., 2019) is: 8.4%    Values used to calculate the score:      Age: 64 years      Sex: Female      Is Non- : Yes      Diabetic: No      Tobacco  "smoker: No      Systolic Blood Pressure: 130 mmHg      Is BP treated: No      HDL Cholesterol: 78 mg/dL      Total Cholesterol: 252 mg/dL      Objective:   /80 (BP Location: Left arm, Patient Position: Sitting)   Pulse 97   Resp 14   Ht 5' 5" (1.651 m)   Wt 81.9 kg (180 lb 8.9 oz)   LMP  (LMP Unknown)   SpO2 97%   BMI 30.05 kg/m²      Body mass index is 30.05 kg/m².       Physical Examination:  General: Alert and awake in no apparent distress  HEENT: Normocephalic and atraumatic; Tms WNL  Eyes:  PERRL; EOMi with anicteric sclera and clear conjunctivae  Mouth:  Oropharynx clear and without exudate; moist mucous membranes  Neck:   Cervical nodes not enlarged; supple; no bruits  Cardio:  Regular rate and rhythm with normal S1 and S2; no murmurs or rubs  Resp:  CTAB; respirations unlabored; no wheezes, crackles or rhonchi  Abdom: Soft, NTND with normoactive bowel sounds; negative HSM  Extrem: Warm and well-perfused with no clubbing, cyanosis or edema  Skin:  No rashes, lesions, or color changes  Pulses:  2+ and symmetric distally  Neuro:  AAOx3; cooperative and pleasant with no focal deficits    Laboratory:      Most Recent Data:  Lab Results   Component Value Date    WBC 5.23 07/22/2025    HGB 11.9 (L) 07/22/2025    HCT 37.8 07/22/2025     07/22/2025    CHOL 252 (H) 07/22/2025    TRIG 130 07/22/2025    HDL 78 (H) 07/22/2025    ALT 14 07/22/2025    AST 17 07/22/2025     07/22/2025    K 4.0 07/22/2025     07/22/2025    BUN 12 07/22/2025    CO2 21 (L) 07/22/2025    TSH 0.648 07/22/2025    HGBA1C 5.0 07/22/2025              CBC:   WBC   Date Value Ref Range Status   07/22/2025 5.23 3.90 - 12.70 K/uL Final     Hemoglobin   Date Value Ref Range Status   05/08/2024 11.6 (L) 12.0 - 16.0 g/dL Final     HGB   Date Value Ref Range Status   07/22/2025 11.9 (L) 12.0 - 16.0 gm/dL Final     Hematocrit   Date Value Ref Range Status   05/08/2024 38.0 37.0 - 48.5 % Final     HCT   Date Value Ref Range " Status   07/22/2025 37.8 37.0 - 48.5 % Final     Platelet Count   Date Value Ref Range Status   07/22/2025 320 150 - 450 K/uL Final     Platelets   Date Value Ref Range Status   05/08/2024 271 150 - 450 K/uL Final     MCV   Date Value Ref Range Status   07/22/2025 93 82 - 98 fL Final   05/08/2024 95 82 - 98 fL Final     RDW   Date Value Ref Range Status   07/22/2025 12.5 11.5 - 14.5 % Final   05/08/2024 12.6 11.5 - 14.5 % Final     BMP:   Sodium   Date Value Ref Range Status   07/22/2025 141 136 - 145 mmol/L Final   05/08/2024 144 136 - 145 mmol/L Final     Potassium   Date Value Ref Range Status   07/22/2025 4.0 3.5 - 5.1 mmol/L Final   05/08/2024 4.3 3.5 - 5.1 mmol/L Final     Chloride   Date Value Ref Range Status   07/22/2025 109 95 - 110 mmol/L Final   05/08/2024 112 (H) 95 - 110 mmol/L Final     CO2   Date Value Ref Range Status   07/22/2025 21 (L) 23 - 29 mmol/L Final   05/08/2024 24 23 - 29 mmol/L Final     BUN   Date Value Ref Range Status   07/22/2025 12 8 - 23 mg/dL Final     Creatinine   Date Value Ref Range Status   07/22/2025 1.1 0.5 - 1.4 mg/dL Final     Glucose   Date Value Ref Range Status   07/22/2025 90 70 - 110 mg/dL Final   05/08/2024 105 70 - 110 mg/dL Final     Calcium   Date Value Ref Range Status   07/22/2025 9.3 8.7 - 10.5 mg/dL Final   05/08/2024 9.1 8.7 - 10.5 mg/dL Final     LFTs:   Protein Total   Date Value Ref Range Status   07/22/2025 7.9 6.0 - 8.4 gm/dL Final     Total Protein   Date Value Ref Range Status   05/08/2024 7.3 6.0 - 8.4 g/dL Final     Albumin   Date Value Ref Range Status   07/22/2025 4.1 3.5 - 5.2 g/dL Final   05/08/2024 3.7 3.5 - 5.2 g/dL Final     Total Bilirubin   Date Value Ref Range Status   05/08/2024 0.5 0.1 - 1.0 mg/dL Final     Comment:     For infants and newborns, interpretation of results should be based  on gestational age, weight and in agreement with clinical  observations.    Premature Infant recommended reference ranges:  Up to 24  "hours.............<8.0 mg/dL  Up to 48 hours............<12.0 mg/dL  3-5 days..................<15.0 mg/dL  6-29 days.................<15.0 mg/dL       Bilirubin Total   Date Value Ref Range Status   07/22/2025 0.4 0.1 - 1.0 mg/dL Final     Comment:     For infants and newborns, interpretation of results should be based   on gestational age, weight and in agreement with clinical   observations.    Premature Infant recommended reference ranges:   0-24 hours:  <8.0 mg/dL   24-48 hours: <12.0 mg/dL   3-5 days:    <15.0 mg/dL   6-29 days:   <15.0 mg/dL     AST   Date Value Ref Range Status   07/22/2025 17 11 - 45 unit/L Final   05/08/2024 15 10 - 40 U/L Final     Alkaline Phosphatase   Date Value Ref Range Status   05/08/2024 68 55 - 135 U/L Final     ALP   Date Value Ref Range Status   07/22/2025 76 40 - 150 unit/L Final     ALT   Date Value Ref Range Status   07/22/2025 14 10 - 44 unit/L Final   05/08/2024 13 10 - 44 U/L Final     Coags: No results found for: "INR", "PROTIME", "PTT"  FLP:      Lab Results   Component Value Date    CHOL 252 (H) 07/22/2025    CHOL 219 (H) 05/08/2024    CHOL 252 (H) 03/23/2023     Lab Results   Component Value Date    HDL 78 (H) 07/22/2025    HDL 80 (H) 05/08/2024    HDL 78 (H) 03/23/2023     Lab Results   Component Value Date    LDLCALC 148.0 07/22/2025    LDLCALC 119.4 05/08/2024    LDLCALC 140.6 03/23/2023     Lab Results   Component Value Date    TRIG 130 07/22/2025    TRIG 98 05/08/2024    TRIG 167 (H) 03/23/2023     Lab Results   Component Value Date    CHOLHDL 31.0 07/22/2025    CHOLHDL 36.5 05/08/2024    CHOLHDL 31.0 03/23/2023      DM:      Hemoglobin A1C   Date Value Ref Range Status   05/08/2024 5.1 4.0 - 5.6 % Final     Comment:     ADA Screening Guidelines:  5.7-6.4%  Consistent with prediabetes  >or=6.5%  Consistent with diabetes    High levels of fetal hemoglobin interfere with the HbA1C  assay. Heterozygous hemoglobin variants (HbS, HgC, etc)do  not significantly " "interfere with this assay.   However, presence of multiple variants may affect accuracy.     03/23/2023 5.0 4.0 - 5.6 % Final     Comment:     ADA Screening Guidelines:  5.7-6.4%  Consistent with prediabetes  >or=6.5%  Consistent with diabetes    High levels of fetal hemoglobin interfere with the HbA1C  assay. Heterozygous hemoglobin variants (HbS, HgC, etc)do  not significantly interfere with this assay.   However, presence of multiple variants may affect accuracy.       Hemoglobin A1c   Date Value Ref Range Status   07/22/2025 5.0 4.0 - 5.6 % Final     Comment:     ADA Screening Guidelines:  5.7-6.4%  Consistent with prediabetes  >=6.5%  Consistent with diabetes    High levels of fetal hemoglobin interfere with the HbA1C  assay. Heterozygous hemoglobin variants (HbS, HgC, etc)do  not significantly interfere with this assay.   However, presence of multiple variants may affect accuracy.     LDL Cholesterol   Date Value Ref Range Status   07/22/2025 148.0 63.0 - 159.0 mg/dL Final     Comment:     The National Cholesterol Education Program (NCEP) has set the  following guidelines (reference values) for LDL Cholesterol:  Optimal.......................<130 mg/dL  Borderline High...............130-159 mg/dL  High..........................160-189 mg/dL  Very High.....................>190 mg/dL  LDL calculated using the Friedewald equation.     Creatinine   Date Value Ref Range Status   07/22/2025 1.1 0.5 - 1.4 mg/dL Final     Thyroid:   TSH   Date Value Ref Range Status   07/22/2025 0.648 0.400 - 4.000 uIU/mL Final     Anemia: No results found for: "IRON", "TIBC", "FERRITIN", "DIROCZEX68", "FOLATE"  Cardiac:   Troponin I   Date Value Ref Range Status   03/29/2019 <0.006 0.000 - 0.026 ng/mL Final     Comment:     The reference interval for Troponin I represents the 99th percentile   cutoff   for our facility and is consistent with 3rd generation assay   performance.       BNP   Date Value Ref Range Status   03/29/2019 13 0 " - 99 pg/mL Final     Comment:     Values of less than 100 pg/ml are consistent with non-CHF populations.     Urinalysis:   Color, UA   Date Value Ref Range Status   05/08/2024 Yellow Yellow, Straw, Suzanna Final     Specific Gravity, UA   Date Value Ref Range Status   05/08/2024 1.020 1.005 - 1.030 Final     Nitrite, UA   Date Value Ref Range Status   05/08/2024 Negative Negative Final     Ketones, UA   Date Value Ref Range Status   05/08/2024 Negative Negative Final     Urobilinogen, UA   Date Value Ref Range Status   05/08/2024 Negative <2.0 EU/dL Final     WBC, UA   Date Value Ref Range Status   05/08/2024 1 0 - 5 /hpf Final       Other Results:  EKG (my interpretation):     Radiology:  Mammo Digital Screening Bilat w/ [facility]  Narrative: Result:  Mammo Digital Screening Bilat w/ [facility]    History:  Patient is 63 y.o. and is seen for a screening mammogram.    Films Compared:  Compared to: 01/26/2022 Mammo Digital Screening Bilat and 09/02/2020 Mammo   Digital Screening Bilat     Findings:  This procedure was performed using tomosynthesis.   Computer-aided detection was utilized in the interpretation of this   examination.    The breasts are almost entirely fatty. There is no evidence of suspicious   masses, microcalcifications or architectural distortion.  Impression:    No mammographic evidence of malignancy.    BI-RADS Category 1: Negative    Recommendation:  Routine screening mammogram in 1 year is recommended.    Your estimated lifetime risk of breast cancer (to age 85) based on   Tyrer-Cuzick risk assessment model is 4.27%.  According to the American   Cancer Society, patients with a lifetime breast cancer risk of 20% or   higher might benefit from supplemental screening tests, such as screening   breast MRI.          Assessment/Plan     Ana Lynch is a 64 y.o. female with:  1. Wellness examination  -     Cancel: Hemoglobin A1C; Future; Expected date: 08/06/2025  -     Cancel: TSH; Future; Expected date:  08/06/2025  -     Cancel: Lipid Panel; Future; Expected date: 08/06/2025  -     Cancel: Comprehensive Metabolic Panel; Future; Expected date: 08/06/2025  -     Cancel: CBC Auto Differential; Future; Expected date: 08/06/2025    2. Adjustment reaction with anxiety and depression  -     busPIRone (BUSPAR) 10 MG tablet; Take 1 tablet (10 mg total) by mouth 3 (three) times daily.  Dispense: 90 tablet; Refill: 11    3. Encounter for screening mammogram for malignant neoplasm of breast  -     Mammo Digital Screening Bilat w/ Ad[facility] (XPD); Future; Expected date: 08/06/2025    4. Vitamin D deficiency  -     Cancel: Vitamin D; Future; Expected date: 08/06/2025    5. Attention deficit disorder (ADD) without hyperactivity  Assessment & Plan:  Start higher dose Vyvanse 50mg daily   Monitor for side effects ie abdominal pain, N/V, headaches, insomnia, weight loss, appetite suppression   Take frequent holidays     Hydrate well, suck on sour gum or lozenges due to dry mout associated with the Vyvanse.     In-person visit in 3 months        6. Anxiety and depression  Assessment & Plan:  Stable on Wellbutrin XL 300mg daily  Start on Trazodone 50mg qhs and hold Vistaril 1/2 tab po qhs to help with insomnia  Cont Buspar 10mg bid prn   Get more natural sunlight by walking  Exercise 150 min weekly ie yoga/pilates/walking, journal, meditate, practice breathing exercises, increase exposure to natural sunlight, sleep at least 7-8 hours per night       7. Glaucoma of both eyes, unspecified glaucoma type  Assessment & Plan:  Cont glaucoma drops  Send me name of specialist       8. Acute intractable tension-type headache  Assessment & Plan:  Start Mag glycinate 500mg qhs       9. Adjustment insomnia  Assessment & Plan:  Start Trazodone 50mg qhs       10. Obesity (BMI 30.0-34.9)  Assessment & Plan:  Exercise 30 min 5 times per week - include weight-bearing exercises 20 min at least twice per week, decrease portion sizes by 50%; encourage  plant-based diet;  drink 6-8 glasses of water daily, avoid fast foods, creamy, rich foods joan ice cream, cheese creamy salad dressings;avoid eating 3 hours prior to bedtime; consider 8-10 hour intermittent diet; avoid simple sugars joan white bread, rice, potatoes, noodles/pasta; no sweetened drinks.    Remember: 1 lb = 3500 calories     Reduce alcohol to max 1-2 drinks per day (1 drink = 12 beer;  1 oz hard liquor; 5 oz wine)    Limit eating at restaurants to once per week; avoid fast foods, junk foods, impulsive eating. Drink 1 glass of lukewarm water before eating. Chew 100 times before swallowing food.     Use the Lose It Song to track calories and aim for eating less than 1200 calories per day.        11. Seasonal allergic rhinitis due to pollen  Assessment & Plan:  Refill flonase 1 sq bid and singulair 10mg qhs                  I spent a total of 36 minutes on the day of the visit.This includes face to face time and non-face to face time preparing to see the patient (eg, review of tests), obtaining and/or reviewing separately obtained history, documenting clinical information in the electronic or other health record, independently interpreting results and communicating results to the patient/family/caregiver, or care coordinator.   Code Status:     Josephine Underwood MD

## 2025-08-06 ENCOUNTER — OFFICE VISIT (OUTPATIENT)
Dept: PRIMARY CARE CLINIC | Facility: CLINIC | Age: 65
End: 2025-08-06
Payer: COMMERCIAL

## 2025-08-06 VITALS
HEIGHT: 65 IN | DIASTOLIC BLOOD PRESSURE: 80 MMHG | SYSTOLIC BLOOD PRESSURE: 130 MMHG | OXYGEN SATURATION: 97 % | HEART RATE: 97 BPM | BODY MASS INDEX: 30.08 KG/M2 | WEIGHT: 180.56 LBS | RESPIRATION RATE: 14 BRPM

## 2025-08-06 DIAGNOSIS — E66.811 OBESITY (BMI 30.0-34.9): ICD-10-CM

## 2025-08-06 DIAGNOSIS — F51.02 ADJUSTMENT INSOMNIA: ICD-10-CM

## 2025-08-06 DIAGNOSIS — G44.201 ACUTE INTRACTABLE TENSION-TYPE HEADACHE: ICD-10-CM

## 2025-08-06 DIAGNOSIS — F43.23 ADJUSTMENT REACTION WITH ANXIETY AND DEPRESSION: ICD-10-CM

## 2025-08-06 DIAGNOSIS — Z12.31 ENCOUNTER FOR SCREENING MAMMOGRAM FOR MALIGNANT NEOPLASM OF BREAST: ICD-10-CM

## 2025-08-06 DIAGNOSIS — F32.A ANXIETY AND DEPRESSION: ICD-10-CM

## 2025-08-06 DIAGNOSIS — F41.9 ANXIETY AND DEPRESSION: ICD-10-CM

## 2025-08-06 DIAGNOSIS — Z00.00 WELLNESS EXAMINATION: Primary | ICD-10-CM

## 2025-08-06 DIAGNOSIS — H40.9 GLAUCOMA OF BOTH EYES, UNSPECIFIED GLAUCOMA TYPE: ICD-10-CM

## 2025-08-06 DIAGNOSIS — E55.9 VITAMIN D DEFICIENCY: ICD-10-CM

## 2025-08-06 DIAGNOSIS — F98.8 ATTENTION DEFICIT DISORDER (ADD) WITHOUT HYPERACTIVITY: ICD-10-CM

## 2025-08-06 DIAGNOSIS — J30.1 SEASONAL ALLERGIC RHINITIS DUE TO POLLEN: ICD-10-CM

## 2025-08-06 PROCEDURE — 1159F MED LIST DOCD IN RCRD: CPT | Mod: CPTII,S$GLB,, | Performed by: INTERNAL MEDICINE

## 2025-08-06 PROCEDURE — 3079F DIAST BP 80-89 MM HG: CPT | Mod: CPTII,S$GLB,, | Performed by: INTERNAL MEDICINE

## 2025-08-06 PROCEDURE — 3075F SYST BP GE 130 - 139MM HG: CPT | Mod: CPTII,S$GLB,, | Performed by: INTERNAL MEDICINE

## 2025-08-06 PROCEDURE — 99999 PR PBB SHADOW E&M-EST. PATIENT-LVL V: CPT | Mod: PBBFAC,,, | Performed by: INTERNAL MEDICINE

## 2025-08-06 PROCEDURE — 99396 PREV VISIT EST AGE 40-64: CPT | Mod: S$GLB,,, | Performed by: INTERNAL MEDICINE

## 2025-08-06 PROCEDURE — 3008F BODY MASS INDEX DOCD: CPT | Mod: CPTII,S$GLB,, | Performed by: INTERNAL MEDICINE

## 2025-08-06 PROCEDURE — 3044F HG A1C LEVEL LT 7.0%: CPT | Mod: CPTII,S$GLB,, | Performed by: INTERNAL MEDICINE

## 2025-08-06 RX ORDER — LISDEXAMFETAMINE DIMESYLATE 50 MG/1
50 CAPSULE ORAL DAILY
Qty: 30 CAPSULE | Refills: 0 | Status: SHIPPED | OUTPATIENT
Start: 2025-08-06

## 2025-08-06 RX ORDER — FLUTICASONE PROPIONATE 50 MCG
1 SPRAY, SUSPENSION (ML) NASAL 2 TIMES DAILY
Qty: 48 G | Refills: 3 | Status: SHIPPED | OUTPATIENT
Start: 2025-08-06

## 2025-08-06 RX ORDER — BUSPIRONE HYDROCHLORIDE 10 MG/1
10 TABLET ORAL 3 TIMES DAILY
Qty: 90 TABLET | Refills: 11 | Status: SHIPPED | OUTPATIENT
Start: 2025-08-06 | End: 2025-08-06

## 2025-08-06 RX ORDER — MONTELUKAST SODIUM 10 MG/1
10 TABLET ORAL NIGHTLY
Qty: 90 TABLET | Refills: 3 | Status: SHIPPED | OUTPATIENT
Start: 2025-08-06

## 2025-08-06 RX ORDER — TRAZODONE HYDROCHLORIDE 50 MG/1
50 TABLET ORAL NIGHTLY
Qty: 90 TABLET | Refills: 3 | Status: SHIPPED | OUTPATIENT
Start: 2025-08-06 | End: 2026-08-06

## 2025-08-06 NOTE — PATIENT INSTRUCTIONS
Wellness examination  -     Cancel: Hemoglobin A1C; Future; Expected date: 08/06/2025  -     Cancel: TSH; Future; Expected date: 08/06/2025  -     Cancel: Lipid Panel; Future; Expected date: 08/06/2025  -     Cancel: Comprehensive Metabolic Panel; Future; Expected date: 08/06/2025  -     Cancel: CBC Auto Differential; Future; Expected date: 08/06/2025    Adjustment reaction with anxiety and depression  -     busPIRone (BUSPAR) 10 MG tablet; Take 1 tablet (10 mg total) by mouth 3 (three) times daily.  Dispense: 90 tablet; Refill: 11    Encounter for screening mammogram for malignant neoplasm of breast  -     Mammo Digital Screening Bilat w/ [facility] (XPD); Future; Expected date: 08/06/2025    Vitamin D deficiency  -     Cancel: Vitamin D; Future; Expected date: 08/06/2025    Attention deficit disorder (ADD) without hyperactivity  Assessment & Plan:  Start higher dose Vyvanse 50mg daily   Monitor for side effects ie abdominal pain, N/V, headaches, insomnia, weight loss, appetite suppression   Take frequent holidays     Hydrate well, suck on sour gum or lozenges due to dry mout associated with the Vyvanse.     In-person visit in 3 months        Anxiety and depression  Assessment & Plan:  Stable on Wellbutrin XL 300mg daily  Start on Trazodone 50mg qhs and hold Vistaril 1/2 tab po qhs to help with insomnia  Get more natural sunlight by walking  Exercise 150 min weekly ie yoga/pilates/walking, journal, meditate, practice breathing exercises, increase exposure to natural sunlight, sleep at least 7-8 hours per night       Glaucoma of both eyes, unspecified glaucoma type  Assessment & Plan:  Cont glaucoma drops  Send me name of specialist       Acute intractable tension-type headache  Assessment & Plan:  Start Mag glycinate 500mg qhs       Adjustment insomnia  Assessment & Plan:  Start Trazodone 50mg qhs       Obesity (BMI 30.0-34.9)  Assessment & Plan:  Exercise 30 min 5 times per week - include weight-bearing exercises 20 min  at least twice per week, decrease portion sizes by 50%; encourage plant-based diet;  drink 6-8 glasses of water daily, avoid fast foods, creamy, rich foods joan ice cream, cheese creamy salad dressings;avoid eating 3 hours prior to bedtime; consider 8-10 hour intermittent diet; avoid simple sugars joan white bread, rice, potatoes, noodles/pasta; no sweetened drinks.    Remember: 1 lb = 3500 calories     Reduce alcohol to max 1-2 drinks per day (1 drink = 12 beer;  1 oz hard liquor; 5 oz wine)    Limit eating at restaurants to once per week; avoid fast foods, junk foods, impulsive eating. Drink 1 glass of lukewarm water before eating. Chew 100 times before swallowing food.     Use the Lose It Song to track calories and aim for eating less than 1200 calories per day.        Seasonal allergic rhinitis due to pollen  Assessment & Plan:  Refill flonase 1 sq bid and singulair 10mg qhs

## 2025-08-06 NOTE — ASSESSMENT & PLAN NOTE
Exercise 30 min 5 times per week - include weight-bearing exercises 20 min at least twice per week, decrease portion sizes by 50%; encourage plant-based diet;  drink 6-8 glasses of water daily, avoid fast foods, creamy, rich foods joan ice cream, cheese creamy salad dressings;avoid eating 3 hours prior to bedtime; consider 8-10 hour intermittent diet; avoid simple sugars joan white bread, rice, potatoes, noodles/pasta; no sweetened drinks.    Remember: 1 lb = 3500 calories     Reduce alcohol to max 1-2 drinks per day (1 drink = 12 beer;  1 oz hard liquor; 5 oz wine)    Limit eating at restaurants to once per week; avoid fast foods, junk foods, impulsive eating. Drink 1 glass of lukewarm water before eating. Chew 100 times before swallowing food.     Use the Lose It Song to track calories and aim for eating less than 1200 calories per day.

## 2025-08-06 NOTE — ASSESSMENT & PLAN NOTE
Start higher dose Vyvanse 50mg daily   Monitor for side effects ie abdominal pain, N/V, headaches, insomnia, weight loss, appetite suppression   Take frequent holidays     Hydrate well, suck on sour gum or lozenges due to dry mout associated with the Vyvanse.     In-person visit in 3 months

## 2025-08-06 NOTE — ASSESSMENT & PLAN NOTE
Stable on Wellbutrin XL 300mg daily  Start on Trazodone 50mg qhs and hold Vistaril 1/2 tab po qhs to help with insomnia  Cont Buspar 10mg bid prn   Get more natural sunlight by walking  Exercise 150 min weekly ie yoga/pilates/walking, journal, meditate, practice breathing exercises, increase exposure to natural sunlight, sleep at least 7-8 hours per night

## 2025-08-26 DIAGNOSIS — F32.5 MAJOR DEPRESSIVE DISORDER IN FULL REMISSION, UNSPECIFIED WHETHER RECURRENT: ICD-10-CM

## 2025-08-26 RX ORDER — BUPROPION HYDROCHLORIDE 300 MG/1
300 TABLET ORAL
Qty: 90 TABLET | Refills: 3 | Status: SHIPPED | OUTPATIENT
Start: 2025-08-26